# Patient Record
Sex: MALE | Race: WHITE | NOT HISPANIC OR LATINO | Employment: OTHER | URBAN - METROPOLITAN AREA
[De-identification: names, ages, dates, MRNs, and addresses within clinical notes are randomized per-mention and may not be internally consistent; named-entity substitution may affect disease eponyms.]

---

## 2019-11-19 ENCOUNTER — PROBLEM (OUTPATIENT)
Dept: URBAN - METROPOLITAN AREA CLINIC 79 | Facility: CLINIC | Age: 74
End: 2019-11-19

## 2019-11-19 DIAGNOSIS — H57.12: ICD-10-CM

## 2019-11-19 DIAGNOSIS — H02.051: ICD-10-CM

## 2019-11-19 DIAGNOSIS — H02.054: ICD-10-CM

## 2019-11-19 PROCEDURE — 92012 INTRM OPH EXAM EST PATIENT: CPT | Mod: 25

## 2019-11-19 PROCEDURE — 67820 REVISE EYELASHES: CPT

## 2019-11-19 ASSESSMENT — VISUAL ACUITY
OS_CC: 20/20-1
OD_CC: 20/25

## 2019-11-19 ASSESSMENT — TONOMETRY
OS_IOP_MMHG: 12
OD_IOP_MMHG: 14

## 2020-08-31 ENCOUNTER — PROBLEM (OUTPATIENT)
Dept: URBAN - METROPOLITAN AREA CLINIC 79 | Facility: CLINIC | Age: 75
End: 2020-08-31

## 2020-08-31 DIAGNOSIS — H25.13: ICD-10-CM

## 2020-08-31 DIAGNOSIS — H57.12: ICD-10-CM

## 2020-08-31 DIAGNOSIS — H02.054: ICD-10-CM

## 2020-08-31 DIAGNOSIS — H02.051: ICD-10-CM

## 2020-08-31 PROCEDURE — 92012 INTRM OPH EXAM EST PATIENT: CPT

## 2020-08-31 ASSESSMENT — VISUAL ACUITY
OD_CC: 20/20-2
OS_CC: 20/20

## 2020-09-22 ENCOUNTER — ESTABLISHED COMPREHENSIVE EXAM (OUTPATIENT)
Dept: URBAN - METROPOLITAN AREA CLINIC 79 | Facility: CLINIC | Age: 75
End: 2020-09-22

## 2020-09-22 VITALS — HEIGHT: 60 IN

## 2020-09-22 DIAGNOSIS — H16.223: ICD-10-CM

## 2020-09-22 DIAGNOSIS — H25.13: ICD-10-CM

## 2020-09-22 DIAGNOSIS — H52.03: ICD-10-CM

## 2020-09-22 PROCEDURE — 92015 DETERMINE REFRACTIVE STATE: CPT | Mod: GY

## 2020-09-22 PROCEDURE — 92014 COMPRE OPH EXAM EST PT 1/>: CPT

## 2020-09-22 ASSESSMENT — TONOMETRY
OD_IOP_MMHG: 14
OS_IOP_MMHG: 15

## 2020-09-22 ASSESSMENT — VISUAL ACUITY
OS_CC: 20/20
OD_SC: 20/40-2
OS_SC: 20/40
OS_CC: J2
OD_CC: 20/30-1
OD_CC: J2

## 2021-05-25 ENCOUNTER — PROBLEM (OUTPATIENT)
Dept: URBAN - METROPOLITAN AREA CLINIC 79 | Facility: CLINIC | Age: 76
End: 2021-05-25

## 2021-05-25 VITALS — HEIGHT: 60 IN

## 2021-05-25 DIAGNOSIS — H02.054: ICD-10-CM

## 2021-05-25 PROCEDURE — 67820 REVISE EYELASHES: CPT

## 2021-05-25 PROCEDURE — 92012 INTRM OPH EXAM EST PATIENT: CPT

## 2021-05-25 ASSESSMENT — VISUAL ACUITY
OS_SC: 20/25-1
OD_SC: 20/40+1
OD_PH: 20/25-2

## 2021-09-14 ENCOUNTER — ESTABLISHED COMPREHENSIVE EXAM (OUTPATIENT)
Dept: URBAN - METROPOLITAN AREA CLINIC 79 | Facility: CLINIC | Age: 76
End: 2021-09-14

## 2021-09-14 VITALS — DIASTOLIC BLOOD PRESSURE: 78 MMHG | HEIGHT: 60 IN | SYSTOLIC BLOOD PRESSURE: 144 MMHG

## 2021-09-14 DIAGNOSIS — H25.13: ICD-10-CM

## 2021-09-14 PROCEDURE — 92014 COMPRE OPH EXAM EST PT 1/>: CPT

## 2021-09-14 ASSESSMENT — TONOMETRY
OD_IOP_MMHG: 14
OS_IOP_MMHG: 14

## 2021-09-14 ASSESSMENT — VISUAL ACUITY
OD_SC: 20/30-2
OD_CC: 20/25
OS_SC: 20/30
OS_CC: 20/25
OS_CC: 20/25
OD_CC: 20/30-2

## 2022-02-18 ENCOUNTER — PROBLEM (OUTPATIENT)
Dept: URBAN - METROPOLITAN AREA CLINIC 79 | Facility: CLINIC | Age: 77
End: 2022-02-18

## 2022-02-18 DIAGNOSIS — H02.054: ICD-10-CM

## 2022-02-18 PROCEDURE — 67820 REVISE EYELASHES: CPT

## 2022-02-18 PROCEDURE — 92012 INTRM OPH EXAM EST PATIENT: CPT

## 2022-02-18 ASSESSMENT — VISUAL ACUITY
OS_CC: 20/20-1
OD_CC: 20/25-2

## 2022-02-18 ASSESSMENT — TONOMETRY: OS_IOP_MMHG: 14

## 2022-03-18 ENCOUNTER — FOLLOW UP (OUTPATIENT)
Dept: URBAN - METROPOLITAN AREA CLINIC 79 | Facility: CLINIC | Age: 77
End: 2022-03-18

## 2022-03-18 DIAGNOSIS — H25.13: ICD-10-CM

## 2022-03-18 PROCEDURE — 92014 COMPRE OPH EXAM EST PT 1/>: CPT

## 2022-03-18 ASSESSMENT — TONOMETRY
OD_IOP_MMHG: 14
OS_IOP_MMHG: 12

## 2022-03-18 ASSESSMENT — VISUAL ACUITY
OS_CC: 20/20
OD_CC: 20/30

## 2022-06-23 ENCOUNTER — PROBLEM (OUTPATIENT)
Dept: URBAN - METROPOLITAN AREA CLINIC 79 | Facility: CLINIC | Age: 77
End: 2022-06-23

## 2022-06-23 DIAGNOSIS — H02.054: ICD-10-CM

## 2022-06-23 PROCEDURE — 92012 INTRM OPH EXAM EST PATIENT: CPT | Mod: 25

## 2022-06-23 PROCEDURE — 67820 REVISE EYELASHES: CPT

## 2022-06-23 ASSESSMENT — VISUAL ACUITY
OS_CC: 20/20
OD_CC: 20/30

## 2022-06-23 ASSESSMENT — TONOMETRY
OD_IOP_MMHG: 12
OS_IOP_MMHG: 11

## 2022-07-29 ENCOUNTER — FOLLOW UP (OUTPATIENT)
Dept: URBAN - METROPOLITAN AREA CLINIC 79 | Facility: CLINIC | Age: 77
End: 2022-07-29

## 2022-07-29 DIAGNOSIS — H25.13: ICD-10-CM

## 2022-07-29 PROCEDURE — 92014 COMPRE OPH EXAM EST PT 1/>: CPT

## 2022-07-29 ASSESSMENT — TONOMETRY
OS_IOP_MMHG: 13
OD_IOP_MMHG: 12

## 2022-07-29 ASSESSMENT — VISUAL ACUITY
OS_CC: 20/20
OD_CC: 20/40+2

## 2022-10-31 PROBLEM — F41.1 GENERALIZED ANXIETY DISORDER: Status: ACTIVE | Noted: 2022-10-31

## 2022-10-31 PROBLEM — F33.42 MAJOR DEPRESSIVE DISORDER, RECURRENT EPISODE, IN FULL REMISSION (HCC): Status: ACTIVE | Noted: 2022-10-31

## 2022-10-31 RX ORDER — QUETIAPINE FUMARATE 50 MG/1
50 TABLET, FILM COATED ORAL
COMMUNITY
End: 2022-11-02 | Stop reason: SDUPTHER

## 2022-10-31 RX ORDER — CLONAZEPAM 0.5 MG/1
0.5 TABLET ORAL 2 TIMES DAILY
COMMUNITY
End: 2022-11-02 | Stop reason: SDUPTHER

## 2022-10-31 RX ORDER — SERTRALINE HYDROCHLORIDE 25 MG/1
25 TABLET, FILM COATED ORAL DAILY
COMMUNITY
End: 2022-11-02 | Stop reason: SDUPTHER

## 2022-11-02 ENCOUNTER — TELEMEDICINE (OUTPATIENT)
Dept: PSYCHIATRY | Facility: CLINIC | Age: 77
End: 2022-11-02

## 2022-11-02 DIAGNOSIS — F41.1 GENERALIZED ANXIETY DISORDER: ICD-10-CM

## 2022-11-02 DIAGNOSIS — F33.42 MAJOR DEPRESSIVE DISORDER, RECURRENT EPISODE, IN FULL REMISSION (HCC): Primary | ICD-10-CM

## 2022-11-02 RX ORDER — CLONAZEPAM 0.5 MG/1
TABLET ORAL
Qty: 45 TABLET | Refills: 1 | Status: SHIPPED | OUTPATIENT
Start: 2022-11-02

## 2022-11-02 RX ORDER — QUETIAPINE FUMARATE 50 MG/1
TABLET, FILM COATED ORAL
Qty: 90 TABLET | Refills: 1 | Status: SHIPPED | OUTPATIENT
Start: 2022-11-02

## 2022-11-02 RX ORDER — SERTRALINE HYDROCHLORIDE 25 MG/1
TABLET, FILM COATED ORAL
Qty: 90 TABLET | Refills: 1 | Status: SHIPPED | OUTPATIENT
Start: 2022-11-02

## 2022-11-02 NOTE — PATIENT INSTRUCTIONS
Continue Current Tx  Report Problems  Transfer to a new provider as it would be easier for him to be seen in person  Return 4 months

## 2022-11-02 NOTE — PSYCH
Virtual Regular Visit    Verification of patient location: At [facility] in [facility]    Patient is located in the following state in which I hold an active license [place]      Assessment/Plan:       Diagnoses and all orders for this visit:    Major depressive disorder, recurrent episode, in full remission ([facility] [address] )  -     sertraline (ZOLOFT) 25 mg tablet; Take 1 Po QD  -     QUEtiapine (SEROquel) 50 mg tablet; deonte 1 PO Q HS  -     clonazePAM (KlonoPIN) 0 5 mg tablet; Take 1/2  PO Q HS    Generalized anxiety disorder  -     sertraline (ZOLOFT) 25 mg tablet; Take 1 Po QD  -     QUEtiapine (SEROquel) 50 mg tablet; deonte 1 PO Q HS  -     clonazePAM (KlonoPIN) 0 5 mg tablet; Take 1/2  PO Q HS    Other orders  -     Discontinue: clonazePAM (KlonoPIN) 0 5 mg tablet; Take 0 5 mg by mouth 2 (two) times a day Take 1/2  PO Q HS  -     Discontinue: sertraline (ZOLOFT) 25 mg tablet; Take 25 mg by mouth daily Take 1 Po QD  -     Discontinue: QUEtiapine (SEROquel) 50 mg tablet; Take 50 mg by mouth daily at bedtime deonte 1 PO Q HS          Goals addressed in session:   Good Health  Counseling provided:      Treatment Recommendations- Risks Benefits       Immediate Medical/Psychiatric/Psychotherapy Treatments and Any Precautions:     Risks, Benefits And Possible Side Effects Of Medications:  Risks, benefits, and possible side effects of medications explained to patient and patient verbalizes understanding    Controlled Medication Discussion: The patient has been filling controlled prescriptions on time as prescribed to [facility] program      Reason for visit is No chief complaint on file  Medication Management      Encounter provider DALILA Matta    Provider [place] at [address] [place] [address]  [phone]      Recent Visits  No visits were found meeting these conditions    Showing recent visits within past 7 days and meeting all other requirements  Today's Visits  Date Type Provider Dept   11/02/22 Telemedicine Carmen Diamond, [facility] today's visits and meeting all other requirements  Future Appointments  No visits were found meeting these conditions  Showing future appointments within next 150 days and meeting all other requirements       The patient was identified by name and date of birth  Lazaro Muller was informed that this is a telemedicine visit and that the visit is being conducted throughthe Tray platform  He agrees to proceed     My office door was closed  No one else was in the room  He acknowledged consent and understanding of privacy and security of the video platform  The patient has agreed to participate and understands they can discontinue the visit at any time  Patient is aware this is a billable service  Subjective    Lazaro Muller is a 68 y o  male    Here today for a med check      normal appetite      HPI  Mood good  Denies anxiety  Watching [employer] in [place] [employer]  No problems with medication  Appetite good  Health OK  Denies SI/HI    No past medical history on file  No past surgical history on file  Current Outpatient Medications   Medication Sig Dispense Refill   • clonazePAM (KlonoPIN) 0 5 mg tablet Take 1/2  PO Q HS 45 tablet 1   • QUEtiapine (SEROquel) 50 mg tablet deonte 1 PO Q HS 90 tablet 1   • sertraline (ZOLOFT) 25 mg tablet Take 1 Po QD 90 tablet 1     No current facility-administered medications for this visit  Not on File    Social History     Substance and Sexual Activity   Drug Use Not on file       No family history on file          Objective    Mental status:  Appearance calm and cooperative  and adequate hygiene and grooming   Mood mood appropriate   Affect affect appropriate    Speech a normal rate and fluent   Thought Processes normal thought processes   Hallucinations no hallucinations present    Thought Content no delusions Abnormal Thoughts no suicidal thoughts  and no homicidal thoughts    Orientation  oriented to person and place and time   Remote Memory short term memory intact and long term memory intact   Attention Span concentration intact   Intellect Appears to be of Average Intelligence   Insight Insight intact   Judgement judgment was intact   Muscle Strength Muscle strength and tone were normal and Normal gait    Language no difficulty naming common objects   Fund of Knowledge displays adequate knowledge of current events   Pain none   Pain Scale 0       Video Exam    There were no vitals filed for this visit      I spent 20  minutes directly with the patient during this visit    Patient Instructions    Continue Current Tx  Report Problems  Transfer to a new provider as it would be easier for him to be seen in person  Return 4 months     Visit Time    Visit Start Time: 1430  Visit Stop Time: 1450  Total Visit Duration: 20 min

## 2022-11-02 NOTE — BH TREATMENT PLAN
TREATMENT PLAN (Medication Management Only)        [facility]    Name and Date of Birth:  Aden Hawkins 68 y o  1945  Date of Treatment Plan: November 2, 2022  Diagnosis/Diagnoses:    1  Major depressive disorder, recurrent episode, in full remission ([address] )    2  Generalized anxiety disorder      Strengths/Personal Resources for Self-Care: supportive family, supportive friends, taking medications as prescribed, ability to adapt to life changes, ability to communicate needs, ability to communicate well, ability to listen, ability to reason, ability to understand psychiatric illness  Area/Areas of need (in own words): depression  1  Long Term Goal: maintain depression  Target Date:6 months - 5/2/2023  Person/Persons responsible for completion of goal: Payal Gardner  2  Short Term Objective (s) - How will we reach this goal?:   A  Provider new recommended medication/dosage changes and/or continue medication(s): continue current medications as prescribed Zoloft, Seroquel, Klonopin  B  N/A   C  N/A  Target Date:6 months - 5/2/2023  Person/Persons Responsible for Completion of Goal: Payal Gardner  Progress Towards Goals: stable  Treatment Modality: medication management every 4 months  Review due 180 days from date of this plan: 6 months - 5/2/2023  Expected length of service: maintenance  My Physician/PA/NP and I have developed this plan together and I agree to work on the goals and objectives  I understand the treatment goals that were developed for my treatment

## 2022-12-01 ENCOUNTER — PROBLEM (OUTPATIENT)
Dept: URBAN - METROPOLITAN AREA CLINIC 79 | Facility: CLINIC | Age: 77
End: 2022-12-01

## 2022-12-01 DIAGNOSIS — H02.054: ICD-10-CM

## 2022-12-01 PROCEDURE — 92012 INTRM OPH EXAM EST PATIENT: CPT

## 2022-12-01 PROCEDURE — 67820 REVISE EYELASHES: CPT

## 2022-12-01 ASSESSMENT — VISUAL ACUITY
OD_CC: 20/25
OS_CC: 20/20

## 2022-12-01 ASSESSMENT — TONOMETRY
OD_IOP_MMHG: 12
OS_IOP_MMHG: 8

## 2022-12-21 ENCOUNTER — DOCUMENTATION (OUTPATIENT)
Dept: PSYCHIATRY | Facility: CLINIC | Age: 77
End: 2022-12-21

## 2022-12-21 NOTE — PSYCH
[facility]    Patient Name Vincent Santiago     Date of Birth: 68 y o  1945      MRN: [id number]    Admission Date: several years ago    Date of Transfer: December 21, 2022    Admission Diagnosis:     Major Depressive Dsiorder, recurrent, in full remission  Generalized Anxiety Disorder    Current Diagnosis:     No diagnosis found  Reason for Admission: Delmi Bush presented for treatment due to depression and anxiety  Primary complaints included DEPRESSIVE SYMPTOMS: unremarkable - euthymic mood and ANXIETY SYMPTOMS: unremarkable  Progress in Treatment: Delmi Bush was seen for Medication Management  During the course of treatment he      Episodes of Higher Level of Care: No    Transfer request Initiated by: Psychiatrist: Nurse Practitioner Carlos Alberto Kendrick Therapist: None    Reason for Transfer Request: patient requested transfer    Does this individual need a clinician with specialized training/expertise?: No    Is this client working with any other hospitals Providers/Therapists?  Psychiatrist: None Therapist: None    Other pertinent issues: Major Depressive Disorder and Generalized Anxiety Disorder    Are there any specific individuals who would be a “best fit” or who have already agreed to accept this transfer request?      Psychiatrist: Nurse Practitioner Carlos Alberto Kendrick   Therapist: None  Rationale: Not Applicable    Attempts to maintain the current therapeutic relationship: Not Applicable    Transfer request routed to Clinical Coordinator for input and/or approval      Comments from other involved providers and/or clinical coordinator: None    Lizz Hurd, [facility]

## 2022-12-29 ENCOUNTER — PROBLEM (OUTPATIENT)
Dept: URBAN - METROPOLITAN AREA CLINIC 79 | Facility: CLINIC | Age: 77
End: 2022-12-29

## 2022-12-29 DIAGNOSIS — H01.021: ICD-10-CM

## 2022-12-29 DIAGNOSIS — H16.223: ICD-10-CM

## 2022-12-29 DIAGNOSIS — H01.024: ICD-10-CM

## 2022-12-29 PROCEDURE — 92012 INTRM OPH EXAM EST PATIENT: CPT

## 2022-12-29 ASSESSMENT — VISUAL ACUITY
OS_CC: 20/25+2
OD_CC: 20/30

## 2022-12-29 ASSESSMENT — TONOMETRY
OS_IOP_MMHG: 10
OD_IOP_MMHG: 11

## 2023-01-18 DIAGNOSIS — F33.42 MAJOR DEPRESSIVE DISORDER, RECURRENT EPISODE, IN FULL REMISSION (HCC): ICD-10-CM

## 2023-01-18 DIAGNOSIS — F41.1 GENERALIZED ANXIETY DISORDER: ICD-10-CM

## 2023-01-18 RX ORDER — CLONAZEPAM 0.5 MG/1
TABLET ORAL
Qty: 45 TABLET | Refills: 1 | Status: SHIPPED | OUTPATIENT
Start: 2023-01-18 | End: 2023-01-19 | Stop reason: SDUPTHER

## 2023-01-18 NOTE — TELEPHONE ENCOUNTER
Patient is a [place]  His last appt 11/22/2022 with Haley with Sergio - 2/22/23 @1:20    Medication Refill Request     Name of Medication Klonopin  Dose/Frequency  5mg/ 1/2 PO Q HS  Quantity 45tabs  Verified pharmacy   [x]  Verified ordering Provider   [x]  Does patient have enough for the next 3 days? Yes [x] No []  Does patient have a follow-up appointment scheduled?  Yes [x] No []   If so when is appointment: [email or web address]

## 2023-01-19 DIAGNOSIS — F41.1 GENERALIZED ANXIETY DISORDER: ICD-10-CM

## 2023-01-19 DIAGNOSIS — F33.42 MAJOR DEPRESSIVE DISORDER, RECURRENT EPISODE, IN FULL REMISSION (HCC): ICD-10-CM

## 2023-01-19 RX ORDER — CLONAZEPAM 0.5 MG/1
TABLET ORAL
Qty: 15 TABLET | Refills: 0 | Status: SHIPPED | OUTPATIENT
Start: 2023-01-19 | End: 2023-02-22 | Stop reason: SDUPTHER

## 2023-02-22 ENCOUNTER — OFFICE VISIT (OUTPATIENT)
Dept: PSYCHIATRY | Facility: CLINIC | Age: 78
End: 2023-02-22

## 2023-02-22 DIAGNOSIS — F33.42 MAJOR DEPRESSIVE DISORDER, RECURRENT EPISODE, IN FULL REMISSION (HCC): ICD-10-CM

## 2023-02-22 DIAGNOSIS — F41.1 GENERALIZED ANXIETY DISORDER: ICD-10-CM

## 2023-02-22 RX ORDER — CLONAZEPAM 0.5 MG/1
TABLET ORAL
Qty: 45 TABLET | Refills: 0 | Status: SHIPPED | OUTPATIENT
Start: 2023-02-22

## 2023-02-22 RX ORDER — SERTRALINE HYDROCHLORIDE 25 MG/1
TABLET, FILM COATED ORAL
Qty: 90 TABLET | Refills: 1 | Status: SHIPPED | OUTPATIENT
Start: 2023-02-22

## 2023-02-22 RX ORDER — QUETIAPINE FUMARATE 50 MG/1
TABLET, FILM COATED ORAL
Qty: 90 TABLET | Refills: 1 | Status: SHIPPED | OUTPATIENT
Start: 2023-02-22

## 2023-02-22 NOTE — PSYCH
Psychiatric Medication Management - [address] 68 y o  male MRN: [id number]        This note was not shared with the patient due to reasonable likelihood of causing patient harm     Reason for Visit: medication refills    Subjective: Former pt of Mary Kay Cho and treated for depression and anxiety  Has been on Klonopin, Seroquel and Zoloft the last 10 years  Pt tolerates medications well  No evidence of EPS/TD or any other medication se  States that medications have been working well  Mood is reported as stable  Pt reports good energy and good motivation  Sleep is reported as " good " Denies SI/HI  Enjoys walking, watching TV and spending time with grandchildren  Pt denies anxiety symptoms  Overall stable  Review Of Systems:     Constitutional negative   ENT Vertigo- testing pending    Cardiovascular Benign heart murmur   Respiratory negative   Gastrointestinal negative   Genitourinary negative   Musculoskeletal Hip and lower back pain- mild   Integumentary negative   Neurological negative   Endocrine negative     Past Medical History:   Patient Active Problem List   Diagnosis   • Generalized anxiety disorder   • Major depressive disorder, recurrent episode, in full remission ([facility] [address] )       Allergies: NKDA    Past Surgical History: cleft palate repair, knee repair s/p sport injury    Past Psychiatric History: Nelida Ferraro    Family Psychiatric History: father committed suicide when pt was 37    Social History: single, lost girlfriend of over 27 years to Covid in 2020, 2 step-children, 3 step- grandkids    Substance Abuse History: pt denies    Traumatic History: emotional trauma ( found father who committed suicide)    The following portions of the patient's history were reviewed and updated as appropriate: past family history, past medical history, past social history, past surgical history and problem list     Objective: There were no vitals filed for this visit        Weight (last 2 days) None          Mental status:  Appearance Casually dressed   Mood euthymic   Affect pleasant   Speech Normal rate, rhythm, and volume   Thought Processes Linear and goal directed   Associations intact   Hallucinations Denies any auditory or visual hallucinations   Thought Content No passive or active suicidal or homicidal ideation, intent, or plan   Orientation Oriented to person, place, time, and situation   Recent and Remote Memory Grossly intact   Attention Span and Concentration intact   Intellect Appears to be of Average Intelligence   Insight intact   Judgement intact   Muscle Strength No abnormal movements   Language Within normal limits   Fund of Knowledge Age appropriate   Pain mild     PHQ-A Depression Screening              ? Assessment/Plan: stable symptoms/ continue current medications: Seroquel 50 mg hs, Zoloft 25 mg daily and Klonopin 0 5 mg 1/2 tab hs prn  Follow up in 3 mos  Diagnosis: Major Depression, recurrent in full remission, BASSEM      Risks, Benefits And Possible Side Effects Of Medications:  Risks, benefits, and possible side effects of medications explained to patient and family, they verbalize understanding    Controlled Medication Discussion: Discussed with patient Black Box warning on concurrent use of benzodiazepines and opioid medications including sedation, respiratory depression, coma and death  Patient understands the risk of treatment with benzodiazepines in addition to opioids and wants to continue taking those medications  Psychotherapy Provided: Supportive psychotherapy provided  Yes  Medication education provided to Jose Whitt        Visit Time    Visit Start Time: 13:34  Visit Stop Time: 14:04  Total Visit Duration: 30 minutes

## 2023-04-20 ENCOUNTER — PROBLEM (OUTPATIENT)
Dept: URBAN - METROPOLITAN AREA CLINIC 79 | Facility: CLINIC | Age: 78
End: 2023-04-20

## 2023-04-20 DIAGNOSIS — H01.021: ICD-10-CM

## 2023-04-20 DIAGNOSIS — H01.024: ICD-10-CM

## 2023-04-20 DIAGNOSIS — H02.054: ICD-10-CM

## 2023-04-20 DIAGNOSIS — H16.223: ICD-10-CM

## 2023-04-20 PROCEDURE — 67820 REVISE EYELASHES: CPT

## 2023-04-20 PROCEDURE — 92012 INTRM OPH EXAM EST PATIENT: CPT

## 2023-04-20 ASSESSMENT — VISUAL ACUITY
OD_CC: 20/30
OS_CC: 20/25-1

## 2023-04-20 ASSESSMENT — TONOMETRY
OD_IOP_MMHG: 16
OS_IOP_MMHG: 15

## 2023-05-17 ENCOUNTER — OFFICE VISIT (OUTPATIENT)
Dept: PSYCHIATRY | Facility: CLINIC | Age: 78
End: 2023-05-17

## 2023-05-17 DIAGNOSIS — F33.42 MAJOR DEPRESSIVE DISORDER, RECURRENT EPISODE, IN FULL REMISSION (HCC): ICD-10-CM

## 2023-05-17 DIAGNOSIS — F41.1 GENERALIZED ANXIETY DISORDER: ICD-10-CM

## 2023-05-17 RX ORDER — SERTRALINE HYDROCHLORIDE 25 MG/1
TABLET, FILM COATED ORAL
Qty: 90 TABLET | Refills: 1 | Status: SHIPPED | OUTPATIENT
Start: 2023-05-17

## 2023-05-17 RX ORDER — QUETIAPINE FUMARATE 50 MG/1
TABLET, FILM COATED ORAL
Qty: 90 TABLET | Refills: 1 | Status: SHIPPED | OUTPATIENT
Start: 2023-05-17

## 2023-05-17 RX ORDER — CLONAZEPAM 0.5 MG/1
TABLET ORAL
Qty: 45 TABLET | Refills: 2 | Status: SHIPPED | OUTPATIENT
Start: 2023-05-17

## 2023-05-17 NOTE — PSYCH
"Psychiatric Medication Management - [address] 66 y o  male MRN: [id number]        This note was not shared with the patient due to reasonable likelihood of causing patient harm     Reason for Visit: medication refills    Subjective: Former pt of Johan Henriquez and treated for depression and anxiety  Has been on Klonopin, Seroquel and Zoloft the last 10 years  Pt tolerates medications well  No evidence of EPS/TD or any other medication se  Pt reports doing well  He reports even and stable mood  Pt reports good energy and good motivation  Sleep is reported as \" good  \" Denies SI/HI  Enjoys walking, watching TV and spending time and visiting family  Has plans to visit family in [place] this summer  Pt denies anxiety symptoms  Overall stable  Review Of Systems:     Constitutional negative   ENT Vertigo- testing pending    Cardiovascular Benign heart murmur   Respiratory negative   Gastrointestinal negative   Genitourinary negative   Musculoskeletal Hip and lower back pain- mild   Integumentary negative   Neurological negative   Endocrine negative     Past Medical History:   Patient Active Problem List   Diagnosis   • Generalized anxiety disorder   • Major depressive disorder, recurrent episode, in full remission ([facility] [address] )       Allergies: NKDA    Past Surgical History: cleft palate repair, knee repair s/p sport injury    Past Psychiatric History: Marsha Mane    Family Psychiatric History: father committed suicide when pt was 37    Social History: single, lost girlfriend of over 27 years to Covid in 2020, 2 step-children, 3 step- grandkids    Substance Abuse History: pt denies    Traumatic History: emotional trauma ( found father who committed suicide)    The following portions of the patient's history were reviewed and updated as appropriate: past family history, past medical history, past social history, past surgical history and problem list     Objective:   There were no vitals filed for this " visit  Weight (last 2 days)     None          Mental status:  Appearance Casually dressed   Mood euthymic   Affect pleasant   Speech Normal rate, rhythm, and volume   Thought Processes Linear and goal directed   Associations intact   Hallucinations Denies any auditory or visual hallucinations   Thought Content No passive or active suicidal or homicidal ideation, intent, or plan   Orientation Oriented to person, place, time, and situation   Recent and Remote Memory Grossly intact   Attention Span and Concentration intact   Intellect Appears to be of Average Intelligence   Insight intact   Judgement intact   Muscle Strength No abnormal movements   Language Within normal limits   Fund of Knowledge Age appropriate   Pain mild     PHQ-A Depression Screening              ? Assessment/Plan: stable symptoms/ continue current medications: Seroquel 50 mg hs, Zoloft 25 mg daily and Klonopin 0 5 mg 1/2 tab hs prn  Pt aware this prescriber leaving and he will follow up with An Mclain in 3 mos  Diagnosis: Major Depression, recurrent in full remission, BASSEM    Risks, Benefits And Possible Side Effects Of Medications:  Risks, benefits, and possible side effects of medications explained to patient and family, they verbalize understanding    Controlled Medication Discussion: Discussed with patient Black Box warning on concurrent use of benzodiazepines and opioid medications including sedation, respiratory depression, coma and death  Patient understands the risk of treatment with benzodiazepines in addition to opioids and wants to continue taking those medications  Psychotherapy Provided: Supportive psychotherapy provided  Yes  Medication education provided to Cherelle Negron        Visit Time    Visit Start Time: 13:20  Visit Stop Time: 13:30  Total Visit Duration: 10 minutes

## 2023-08-04 ENCOUNTER — ESTABLISHED COMPREHENSIVE EXAM (OUTPATIENT)
Dept: URBAN - METROPOLITAN AREA CLINIC 79 | Facility: CLINIC | Age: 78
End: 2023-08-04

## 2023-08-04 DIAGNOSIS — H02.054: ICD-10-CM

## 2023-08-04 DIAGNOSIS — H40.013: ICD-10-CM

## 2023-08-04 DIAGNOSIS — H25.13: ICD-10-CM

## 2023-08-04 PROCEDURE — 92133 CPTRZD OPH DX IMG PST SGM ON: CPT

## 2023-08-04 PROCEDURE — 92014 COMPRE OPH EXAM EST PT 1/>: CPT | Mod: 25

## 2023-08-04 PROCEDURE — 67820 REVISE EYELASHES: CPT

## 2023-08-04 ASSESSMENT — TONOMETRY
OD_IOP_MMHG: 11
OS_IOP_MMHG: 10

## 2023-08-04 ASSESSMENT — VISUAL ACUITY
OS_SC: 20/30+1
OS_CC: 20/25
OS_CC: 20/20-2
OD_SC: 20/40
OD_CC: 20/25
OD_CC: 20/25+2

## 2023-08-08 ENCOUNTER — PROBLEM (OUTPATIENT)
Dept: URBAN - METROPOLITAN AREA CLINIC 79 | Facility: CLINIC | Age: 78
End: 2023-08-08

## 2023-08-08 DIAGNOSIS — H16.223: ICD-10-CM

## 2023-08-08 PROCEDURE — 92012 INTRM OPH EXAM EST PATIENT: CPT

## 2023-08-08 ASSESSMENT — VISUAL ACUITY
OS_CC: 20/20
OD_CC: 20/25

## 2023-08-08 ASSESSMENT — TONOMETRY
OS_IOP_MMHG: 14
OD_IOP_MMHG: 14

## 2023-08-17 ENCOUNTER — OFFICE VISIT (OUTPATIENT)
Dept: PSYCHIATRY | Facility: CLINIC | Age: 78
End: 2023-08-17
Payer: MEDICARE

## 2023-08-17 DIAGNOSIS — F33.42 MAJOR DEPRESSIVE DISORDER, RECURRENT EPISODE, IN FULL REMISSION (HCC): ICD-10-CM

## 2023-08-17 DIAGNOSIS — F41.1 GENERALIZED ANXIETY DISORDER: ICD-10-CM

## 2023-08-17 PROCEDURE — 99212 OFFICE O/P EST SF 10 MIN: CPT

## 2023-08-17 RX ORDER — QUETIAPINE FUMARATE 50 MG/1
TABLET, FILM COATED ORAL
Qty: 90 TABLET | Refills: 1 | Status: SHIPPED | OUTPATIENT
Start: 2023-08-17

## 2023-08-17 RX ORDER — CLONAZEPAM 0.5 MG/1
TABLET ORAL
Qty: 45 TABLET | Refills: 2 | Status: SHIPPED | OUTPATIENT
Start: 2023-08-17

## 2023-08-17 RX ORDER — SERTRALINE HYDROCHLORIDE 25 MG/1
TABLET, FILM COATED ORAL
Qty: 90 TABLET | Refills: 1 | Status: SHIPPED | OUTPATIENT
Start: 2023-08-17

## 2023-08-17 NOTE — PSYCH
Psychiatric Medication Management - [facility] 66 y.o. male MRN: [id number]        This note was not shared with the patient due to reasonable likelihood of causing patient harm     Reason for Visit: medication management    Subjective: Former patient of Shanna Bassett is being treated for depression and anxiety. Patient is observed on Klonopin 0.5mg 1/2 tab hs prn, seroquel 50mg po hs, and zoloft 25mg po daily and tolerates it well. Patient has been compliant with his medications and denies any side effects. Patient reports he has been doing well since last appointment and mood has been stable. Sleep and appetite has been stable. Good energy and motivation. Patient enjoys gardening with his grandson. Patient denies panic attacks, ah/vh, paranoia, elevated moods, and si/hi.        Review Of Systems:     Constitutional negative   ENT Vertigo- testing pending    Cardiovascular Benign heart murmur   Respiratory negative   Gastrointestinal negative   Genitourinary negative   Musculoskeletal Hip and lower back pain- mild   Integumentary negative   Neurological negative   Endocrine negative     Past Medical History:   Patient Active Problem List   Diagnosis   • Generalized anxiety disorder   • Major depressive disorder, recurrent episode, in full remission ([address])       Allergies: NKDA    Past Surgical History: cleft palate repair, knee repair s/p sport injury    Past Psychiatric History: Rosio Boland    Family Psychiatric History: father committed suicide when pt was 37    Social History: single, lost girlfriend of over 27 years to Covid in 2020, 2 step-children, 3 step- grandkids    Substance Abuse History: pt denies    Traumatic History: emotional trauma ( found father who committed suicide)    The following portions of the patient's history were reviewed and updated as appropriate: past family history, past medical history, past social history, past surgical history and problem list.    Objective: There were no vitals filed for this visit. Weight (last 2 days)     None          Mental status:  Appearance Casually dressed   Mood euthymic   Affect pleasant   Speech Normal rate, rhythm, and volume   Thought Processes Linear and goal directed   Associations intact   Hallucinations Denies any auditory or visual hallucinations   Thought Content No passive or active suicidal or homicidal ideation, intent, or plan   Orientation Oriented to person, place, time, and situation   Recent and Remote Memory Grossly intact   Attention Span and Concentration intact   Intellect Appears to be of Average Intelligence   Insight intact   Judgement intact   Muscle Strength No abnormal movements   Language Within normal limits   Fund of Knowledge Age appropriate   Pain mild     PHQ-A Depression Screening              Assessment/Plan:   1. Continue Seroquel 50 mg hs  2. Continue Zoloft 25 mg daily  3. Continue Klonopin 0.5 mg 1/2 tab hs prn  4. Call if any adverse medication side effects  5. Follow up in 3 months      Diagnosis: Major Depression, recurrent in full remission, BASSEM    Risks, Benefits And Possible Side Effects Of Medications:  Risks, benefits, and possible side effects of medications explained to patient and family, they verbalize understanding    Controlled Medication Discussion: Discussed with patient Black Box warning on concurrent use of benzodiazepines and opioid medications including sedation, respiratory depression, coma and death. Patient understands the risk of treatment with benzodiazepines in addition to opioids and wants to continue taking those medications. Psychotherapy Provided: Supportive psychotherapy provided. Yes  Medication education provided to Agnieszka Coronado.       Visit Time    Visit Start Time: 12:30 PM  Visit Stop Time: 12:45 PM  Total Visit Duration: 15 minutes

## 2023-10-31 DIAGNOSIS — F33.42 MAJOR DEPRESSIVE DISORDER, RECURRENT EPISODE, IN FULL REMISSION (HCC): ICD-10-CM

## 2023-10-31 DIAGNOSIS — F41.1 GENERALIZED ANXIETY DISORDER: ICD-10-CM

## 2023-10-31 RX ORDER — CLONAZEPAM 0.5 MG/1
TABLET ORAL
Qty: 3 TABLET | Refills: 0 | Status: SHIPPED | OUTPATIENT
Start: 2023-10-31

## 2023-11-16 ENCOUNTER — OFFICE VISIT (OUTPATIENT)
Dept: PSYCHIATRY | Facility: CLINIC | Age: 78
End: 2023-11-16
Payer: MEDICARE

## 2023-11-16 DIAGNOSIS — F33.42 MAJOR DEPRESSIVE DISORDER, RECURRENT EPISODE, IN FULL REMISSION (HCC): ICD-10-CM

## 2023-11-16 DIAGNOSIS — F41.1 GENERALIZED ANXIETY DISORDER: ICD-10-CM

## 2023-11-16 PROCEDURE — 99212 OFFICE O/P EST SF 10 MIN: CPT

## 2023-11-16 RX ORDER — QUETIAPINE FUMARATE 50 MG/1
TABLET, FILM COATED ORAL
Qty: 90 TABLET | Refills: 1 | Status: SHIPPED | OUTPATIENT
Start: 2023-11-16

## 2023-11-16 RX ORDER — CLONAZEPAM 0.5 MG/1
TABLET ORAL
Qty: 45 TABLET | Refills: 0 | Status: SHIPPED | OUTPATIENT
Start: 2023-11-16

## 2023-11-16 RX ORDER — SERTRALINE HYDROCHLORIDE 25 MG/1
TABLET, FILM COATED ORAL
Qty: 90 TABLET | Refills: 1 | Status: SHIPPED | OUTPATIENT
Start: 2023-11-16

## 2023-11-16 NOTE — PSYCH
Psychiatric Medication Management - [facility] 66 y.o. male MRN: [id number]        This note was not shared with the patient due to reasonable likelihood of causing patient harm     Reason for Visit: medication management    Subjective:   Patient is being treated for depression and anxiety. Patient is observed on Klonopin 0.5mg 1/2 tab hs prn, seroquel 50mg po hs, and zoloft 25mg po daily. Patient tolerates the medications well, has been compliant and denies any side effects. Patient doing well since last appointment and reports stable mood. Patient denies dysphoric moods including depression and anxiety. Sleep and appetite has been stable. Good energy and motivation. Patient enjoys watching the stock market. Patient denies elevated moods, panic attacks, paranoia, ah/vh and si/hi.       Review Of Systems:     Constitutional negative   ENT Vertigo- testing pending    Cardiovascular Benign heart murmur   Respiratory negative   Gastrointestinal negative   Genitourinary negative   Musculoskeletal Hip and lower back pain- mild   Integumentary negative   Neurological negative   Endocrine negative     Past Medical History:   Patient Active Problem List   Diagnosis    Generalized anxiety disorder    Major depressive disorder, recurrent episode, in full remission ([address])       Allergies: NKDA    Past Surgical History: cleft palate repair, knee repair s/p sport injury    Past Psychiatric History: Yarely Mortensen    Family Psychiatric History: father committed suicide when pt was 37    Social History: single, lost girlfriend of over 27 years to Covid in 2020, 2 step-children, 3 step- grandkids    Substance Abuse History: pt denies    Traumatic History: emotional trauma ( found father who committed suicide)    The following portions of the patient's history were reviewed and updated as appropriate: past family history, past medical history, past social history, past surgical history and problem list.    Objective: There were no vitals filed for this visit. Weight (last 2 days)       None            Mental status:  Appearance Casually dressed   Mood euthymic   Affect pleasant   Speech Normal rate, rhythm, and volume   Thought Processes Linear and goal directed   Associations intact   Hallucinations Denies any auditory or visual hallucinations   Thought Content No passive or active suicidal or homicidal ideation, intent, or plan   Orientation Oriented to person, place, time, and situation   Recent and Remote Memory Grossly intact   Attention Span and Concentration intact   Intellect Appears to be of Average Intelligence   Insight intact   Judgement intact   Muscle Strength No abnormal movements   Language Within normal limits   Fund of Knowledge Age appropriate   Pain mild     PHQ-A Depression Screening                Assessment/Plan:   1. Continue Seroquel 50 mg hs  2. Continue Zoloft 25 mg daily  3. Continue Klonopin 0.5 mg 1/2 tab hs prn  4. Call if any adverse medication side effects  5. Follow up in 3 months      Diagnosis: Major Depression, recurrent in full remission, BASSEM    Risks, Benefits And Possible Side Effects Of Medications:  Risks, benefits, and possible side effects of medications explained to patient and family, they verbalize understanding    Controlled Medication Discussion: Discussed with patient Black Box warning on concurrent use of benzodiazepines and opioid medications including sedation, respiratory depression, coma and death. Patient understands the risk of treatment with benzodiazepines in addition to opioids and wants to continue taking those medications. Psychotherapy Provided: Supportive psychotherapy provided. Yes  Medication education provided to Daria Anuj.       Visit Time    Visit Start Time: 12:50 PM  Visit Stop Time: 1:00 PM  Total Visit Duration:  10 minutes

## 2023-12-22 ENCOUNTER — PROBLEM (OUTPATIENT)
Dept: URBAN - METROPOLITAN AREA CLINIC 79 | Facility: CLINIC | Age: 78
End: 2023-12-22

## 2023-12-22 DIAGNOSIS — H00.14: ICD-10-CM

## 2023-12-22 DIAGNOSIS — H25.13: ICD-10-CM

## 2023-12-22 PROCEDURE — 92012 INTRM OPH EXAM EST PATIENT: CPT

## 2023-12-22 ASSESSMENT — VISUAL ACUITY
OS_CC: 20/20
OD_CC: 20/20-1

## 2023-12-22 ASSESSMENT — TONOMETRY
OS_IOP_MMHG: 16
OD_IOP_MMHG: 13

## 2024-01-05 ENCOUNTER — FOLLOW UP (OUTPATIENT)
Dept: URBAN - METROPOLITAN AREA CLINIC 79 | Facility: CLINIC | Age: 79
End: 2024-01-05

## 2024-01-05 DIAGNOSIS — H00.14: ICD-10-CM

## 2024-01-05 DIAGNOSIS — H25.13: ICD-10-CM

## 2024-01-05 PROCEDURE — 92012 INTRM OPH EXAM EST PATIENT: CPT

## 2024-01-05 ASSESSMENT — VISUAL ACUITY
OD_CC: 20/25-2
OS_CC: 20/20

## 2024-01-05 ASSESSMENT — TONOMETRY
OD_IOP_MMHG: 14
OS_IOP_MMHG: 14

## 2024-02-06 ENCOUNTER — IOP CHECK (OUTPATIENT)
Dept: URBAN - METROPOLITAN AREA CLINIC 79 | Facility: CLINIC | Age: 79
End: 2024-02-06

## 2024-02-06 DIAGNOSIS — H02.056: ICD-10-CM

## 2024-02-06 DIAGNOSIS — H40.013: ICD-10-CM

## 2024-02-06 DIAGNOSIS — H02.054: ICD-10-CM

## 2024-02-06 PROCEDURE — 76514 ECHO EXAM OF EYE THICKNESS: CPT

## 2024-02-06 PROCEDURE — 92083 EXTENDED VISUAL FIELD XM: CPT

## 2024-02-06 PROCEDURE — 67820 REVISE EYELASHES: CPT

## 2024-02-06 PROCEDURE — 92012 INTRM OPH EXAM EST PATIENT: CPT | Mod: 25

## 2024-02-06 ASSESSMENT — VISUAL ACUITY
OD_CC: 20/20-1
OS_CC: 20/20

## 2024-02-06 ASSESSMENT — TONOMETRY
OD_IOP_MMHG: 14
OS_IOP_MMHG: 12

## 2024-02-06 ASSESSMENT — PACHYMETRY
OS_CT_UM: 576
OD_CT_UM: 582

## 2024-02-21 ENCOUNTER — OFFICE VISIT (OUTPATIENT)
Dept: PSYCHIATRY | Facility: CLINIC | Age: 79
End: 2024-02-21
Payer: MEDICARE

## 2024-02-21 DIAGNOSIS — F33.42 MAJOR DEPRESSIVE DISORDER, RECURRENT EPISODE, IN FULL REMISSION (HCC): ICD-10-CM

## 2024-02-21 DIAGNOSIS — F41.1 GENERALIZED ANXIETY DISORDER: ICD-10-CM

## 2024-02-21 PROCEDURE — 99213 OFFICE O/P EST LOW 20 MIN: CPT

## 2024-02-21 RX ORDER — QUETIAPINE FUMARATE 50 MG/1
TABLET, FILM COATED ORAL
Qty: 90 TABLET | Refills: 1 | Status: SHIPPED | OUTPATIENT
Start: 2024-02-21

## 2024-02-21 RX ORDER — SERTRALINE HYDROCHLORIDE 25 MG/1
TABLET, FILM COATED ORAL
Qty: 90 TABLET | Refills: 1 | Status: SHIPPED | OUTPATIENT
Start: 2024-02-21

## 2024-02-21 RX ORDER — CLONAZEPAM 0.5 MG/1
TABLET ORAL
Qty: 45 TABLET | Refills: 1 | Status: SHIPPED | OUTPATIENT
Start: 2024-02-21

## 2024-02-21 NOTE — PSYCH
"Psychiatric Medication Management - Behavioral Health   Flavio Cuevas 78 y.o. male MRN: [id number]        This note was not shared with the patient due to reasonable likelihood of causing patient harm     Reason for Visit: medication management    Subjective:   Patient is being treated for depression and anxiety. Patient is observed on Klonopin 0.5mg 1/2 tab hs prn, seroquel 50mg po hs, and zoloft 25mg po daily. Patient tolerates the medications well, has been compliant and denies any side effects. Patient doing well overall since last appointment however talks about having high anxiety 3-4 weeks ago that was related to the stock market. Patient reports this was the first time in 15 years that he thought he needed to talk to someone about it. Patient reports he is \"back to normal\" currently and doing well with stable mood. Sleep and appetite has been stable. Good energy and motivation. Patient enjoys watching the stock market. Patient denies elevated moods, panic attacks, paranoia, ah/vh and si/hi.         Review Of Systems:     Constitutional negative   ENT Vertigo- testing pending    Cardiovascular Benign heart murmur   Respiratory negative   Gastrointestinal negative   Genitourinary negative   Musculoskeletal Hip and lower back pain- mild   Integumentary negative   Neurological negative   Endocrine negative     Past Medical History:   Patient Active Problem List   Diagnosis    Generalized anxiety disorder    Major depressive disorder, recurrent episode, in full remission ([facility]ly [facility])       Allergies: NKDA    Past Surgical History: cleft palate repair, knee repair s/p sport injury    Past Psychiatric History: Chriss Mayers    Family Psychiatric History: father committed suicide when pt was 43    Social History: single, lost girlfriend of over 30 years to Covid in 2020, 2 step-children, 3 step- grandkids    Substance Abuse History: pt denies    Traumatic History: emotional trauma ( found father who committed " suicide)    The following portions of the patient's history were reviewed and updated as appropriate: past family history, past medical history, past social history, past surgical history and problem list.    Objective:  There were no vitals filed for this visit.      Weight (last 2 days)       None            Mental status:  Appearance Casually dressed   Mood euthymic   Affect pleasant   Speech Normal rate, rhythm, and volume   Thought Processes Linear and goal directed   Associations intact   Hallucinations Denies any auditory or visual hallucinations   Thought Content No passive or active suicidal or homicidal ideation, intent, or plan   Orientation Oriented to person, place, time, and situation   Recent and Remote Memory Grossly intact   Attention Span and Concentration intact   Intellect Appears to be of Average Intelligence   Insight intact   Judgement intact   Muscle Strength No abnormal movements   Language Within normal limits   Fund of Knowledge Age appropriate   Pain mild     PHQ-A Depression Screening                Assessment/Plan:   1. Continue Seroquel 50 mg hs  2. Continue Zoloft 25 mg daily  3. Continue Klonopin 0.5 mg 1/2 tab hs prn  4. Call if any adverse medication side effects  5. Follow up in 3 months      Diagnosis: Major Depression, recurrent in full remission, BASSEM    Risks, Benefits And Possible Side Effects Of Medications:  Risks, benefits, and possible side effects of medications explained to patient and family, they verbalize understanding    Controlled Medication Discussion: Discussed with patient Black Box warning on concurrent use of benzodiazepines and opioid medications including sedation, respiratory depression, coma and death. Patient understands the risk of treatment with benzodiazepines in addition to opioids and wants to continue taking those medications.      Psychotherapy Provided: Supportive psychotherapy provided.     Yes  Medication education provided to Flavio.      Visit  Time    Visit Start Time: 2:30 PM  Visit Stop Time: 2:50 PM  Total Visit Duration:  20 minutes

## 2024-03-01 ENCOUNTER — HOSPITAL ENCOUNTER (OUTPATIENT)
Dept: HOSPITAL 99 - MRI 3T | Age: 79
End: 2024-03-01
Payer: MEDICARE

## 2024-03-01 DIAGNOSIS — G31.84: Primary | ICD-10-CM

## 2024-03-01 DIAGNOSIS — R42: ICD-10-CM

## 2024-03-01 PROCEDURE — A9575 INJ GADOTERATE MEGLUMI 0.1ML: HCPCS

## 2024-04-05 VITALS — DIASTOLIC BLOOD PRESSURE: 95 MMHG | SYSTOLIC BLOOD PRESSURE: 163 MMHG | RESPIRATION RATE: 10 BRPM

## 2024-04-05 VITALS — SYSTOLIC BLOOD PRESSURE: 176 MMHG | RESPIRATION RATE: 16 BRPM | DIASTOLIC BLOOD PRESSURE: 98 MMHG

## 2024-04-05 VITALS — DIASTOLIC BLOOD PRESSURE: 98 MMHG | RESPIRATION RATE: 23 BRPM | SYSTOLIC BLOOD PRESSURE: 184 MMHG

## 2024-04-05 VITALS — RESPIRATION RATE: 18 BRPM | DIASTOLIC BLOOD PRESSURE: 87 MMHG | SYSTOLIC BLOOD PRESSURE: 167 MMHG

## 2024-04-05 VITALS — SYSTOLIC BLOOD PRESSURE: 147 MMHG | DIASTOLIC BLOOD PRESSURE: 129 MMHG

## 2024-04-05 VITALS — DIASTOLIC BLOOD PRESSURE: 84 MMHG | RESPIRATION RATE: 19 BRPM | SYSTOLIC BLOOD PRESSURE: 162 MMHG

## 2024-04-05 VITALS — SYSTOLIC BLOOD PRESSURE: 165 MMHG | DIASTOLIC BLOOD PRESSURE: 90 MMHG

## 2024-04-05 VITALS — RESPIRATION RATE: 17 BRPM

## 2024-04-05 LAB
ALBUMIN SERPL-MCNC: 4.2 G/DL (ref 3.5–5)
ALP SERPL-CCNC: 72 U/L (ref 38–126)
ALT SERPL-CCNC: 18 U/L (ref 0–50)
AST SERPL-CCNC: 23 U/L (ref 17–59)
BUN SERPL-MCNC: 22 MG/DL (ref 9–20)
CALCIUM SERPL-MCNC: 9.1 MG/DL (ref 8.4–10.2)
CHLORIDE SERPL-SCNC: 99 MMOL/L (ref 98–107)
CO2 SERPL-SCNC: 27 MMOL/L (ref 22–30)
EGFR: > 60
ERYTHROCYTE [DISTWIDTH] IN BLOOD BY AUTOMATED COUNT: 12.5 % (ref 11.5–14.5)
ESTIMATED CREATININE CLEARANCE: 64 ML/MIN
GLUCOSE SERPL-MCNC: 137 MG/DL (ref 70–99)
HCT VFR BLD AUTO: 37.2 % (ref 39–52)
HGB BLD-MCNC: 13.4 G/DL (ref 13–18)
MCHC RBC AUTO-ENTMCNC: 36 G/DL (ref 33–37)
MCV RBC AUTO: 89.6 FL (ref 80–94)
NRBC BLD AUTO-RTO: 0 %
PLATELET # BLD AUTO: 192 10^3/UL (ref 130–400)
POTASSIUM SERPL-SCNC: 4.1 MMOL/L (ref 3.5–5.1)
PROT SERPL-MCNC: 6.8 G/DL (ref 6.3–8.2)
SODIUM SERPL-SCNC: 133 MMOL/L (ref 135–145)
TROPONIN I SERPL-MCNC: < 0.012 NG/ML

## 2024-04-05 RX ADMIN — METOCLOPRAMIDE HYDROCHLORIDE 10 MG: 5 INJECTION INTRAMUSCULAR; INTRAVENOUS at 22:07

## 2024-04-06 VITALS — SYSTOLIC BLOOD PRESSURE: 162 MMHG | DIASTOLIC BLOOD PRESSURE: 94 MMHG | OXYGEN SATURATION: 97 % | HEART RATE: 94 BPM

## 2024-04-06 VITALS — SYSTOLIC BLOOD PRESSURE: 162 MMHG | DIASTOLIC BLOOD PRESSURE: 88 MMHG | RESPIRATION RATE: 22 BRPM

## 2024-04-06 VITALS — DIASTOLIC BLOOD PRESSURE: 73 MMHG | SYSTOLIC BLOOD PRESSURE: 131 MMHG | RESPIRATION RATE: 16 BRPM

## 2024-04-06 VITALS — SYSTOLIC BLOOD PRESSURE: 117 MMHG | RESPIRATION RATE: 16 BRPM | DIASTOLIC BLOOD PRESSURE: 73 MMHG

## 2024-04-06 VITALS — SYSTOLIC BLOOD PRESSURE: 141 MMHG | DIASTOLIC BLOOD PRESSURE: 72 MMHG | RESPIRATION RATE: 16 BRPM

## 2024-04-06 VITALS — DIASTOLIC BLOOD PRESSURE: 97 MMHG | RESPIRATION RATE: 18 BRPM | SYSTOLIC BLOOD PRESSURE: 161 MMHG

## 2024-04-06 VITALS — SYSTOLIC BLOOD PRESSURE: 162 MMHG | DIASTOLIC BLOOD PRESSURE: 64 MMHG

## 2024-04-06 VITALS — SYSTOLIC BLOOD PRESSURE: 131 MMHG | RESPIRATION RATE: 21 BRPM | DIASTOLIC BLOOD PRESSURE: 86 MMHG

## 2024-04-06 VITALS — DIASTOLIC BLOOD PRESSURE: 64 MMHG | SYSTOLIC BLOOD PRESSURE: 162 MMHG

## 2024-04-06 VITALS — RESPIRATION RATE: 16 BRPM | DIASTOLIC BLOOD PRESSURE: 92 MMHG | SYSTOLIC BLOOD PRESSURE: 159 MMHG

## 2024-04-06 VITALS — RESPIRATION RATE: 17 BRPM | DIASTOLIC BLOOD PRESSURE: 73 MMHG | SYSTOLIC BLOOD PRESSURE: 134 MMHG

## 2024-04-06 VITALS — RESPIRATION RATE: 16 BRPM | SYSTOLIC BLOOD PRESSURE: 161 MMHG | DIASTOLIC BLOOD PRESSURE: 95 MMHG

## 2024-04-06 VITALS — DIASTOLIC BLOOD PRESSURE: 76 MMHG | RESPIRATION RATE: 14 BRPM | SYSTOLIC BLOOD PRESSURE: 127 MMHG

## 2024-04-06 VITALS — DIASTOLIC BLOOD PRESSURE: 72 MMHG | RESPIRATION RATE: 16 BRPM | SYSTOLIC BLOOD PRESSURE: 125 MMHG

## 2024-04-06 VITALS — RESPIRATION RATE: 18 BRPM | SYSTOLIC BLOOD PRESSURE: 135 MMHG | DIASTOLIC BLOOD PRESSURE: 76 MMHG

## 2024-04-06 VITALS — DIASTOLIC BLOOD PRESSURE: 91 MMHG | SYSTOLIC BLOOD PRESSURE: 165 MMHG | RESPIRATION RATE: 16 BRPM

## 2024-04-06 VITALS — SYSTOLIC BLOOD PRESSURE: 133 MMHG | HEART RATE: 84 BPM | DIASTOLIC BLOOD PRESSURE: 76 MMHG | RESPIRATION RATE: 16 BRPM

## 2024-04-06 LAB
BUN SERPL-MCNC: 19 MG/DL (ref 9–20)
CALCIUM SERPL-MCNC: 8.9 MG/DL (ref 8.4–10.2)
CHLORIDE SERPL-SCNC: 99 MMOL/L (ref 98–107)
CO2 SERPL-SCNC: 28 MMOL/L (ref 22–30)
EGFR: > 60
ERYTHROCYTE [DISTWIDTH] IN BLOOD BY AUTOMATED COUNT: 12.4 % (ref 11.5–14.5)
ESTIMATED CREATININE CLEARANCE: 73 ML/MIN
GLUCOSE SERPL-MCNC: 115 MG/DL (ref 70–99)
HCT VFR BLD AUTO: 36.8 % (ref 39–52)
HGB BLD-MCNC: 12.8 G/DL (ref 13–18)
MCHC RBC AUTO-ENTMCNC: 34.8 G/DL (ref 33–37)
MCV RBC AUTO: 91.5 FL (ref 80–94)
PLATELET # BLD AUTO: 199 10^3/UL (ref 130–400)
POTASSIUM SERPL-SCNC: 4 MMOL/L (ref 3.5–5.1)
SODIUM SERPL-SCNC: 134 MMOL/L (ref 135–145)

## 2024-04-06 RX ADMIN — FINASTERIDE 5 MG: 5 TABLET, FILM COATED ORAL at 08:29

## 2024-04-06 RX ADMIN — SODIUM CHLORIDE 1000: 900 INJECTION, SOLUTION INTRAVENOUS at 18:42

## 2024-04-06 RX ADMIN — DIAZEPAM 5 MG: 5 TABLET ORAL at 16:12

## 2024-04-06 RX ADMIN — DIAZEPAM 5 MG: 2 TABLET ORAL at 08:37

## 2024-04-06 RX ADMIN — CLOPIDOGREL BISULFATE 75 MG: 75 TABLET ORAL at 08:29

## 2024-04-06 RX ADMIN — MECLIZINE HYDROCHLORIDE 12.5 MG: 12.5 TABLET ORAL at 05:37

## 2024-04-06 RX ADMIN — SODIUM CHLORIDE 1000: 900 INJECTION, SOLUTION INTRAVENOUS at 05:35

## 2024-04-06 RX ADMIN — ATORVASTATIN CALCIUM 40 MG: 40 TABLET, FILM COATED ORAL at 20:57

## 2024-04-06 RX ADMIN — MECLIZINE HYDROCHLORIDE 12.5 MG: 12.5 TABLET ORAL at 13:42

## 2024-04-06 RX ADMIN — TAMSULOSIN HYDROCHLORIDE 0.4 MG: 0.4 CAPSULE ORAL at 08:29

## 2024-04-06 RX ADMIN — LORAZEPAM 1 MG: 2 INJECTION INTRAMUSCULAR; INTRAVENOUS at 00:37

## 2024-04-06 RX ADMIN — DIAZEPAM 5 MG: 5 TABLET ORAL at 20:58

## 2024-04-06 RX ADMIN — QUETIAPINE 12.5 MG: 25 TABLET, FILM COATED ORAL at 20:56

## 2024-04-07 VITALS — SYSTOLIC BLOOD PRESSURE: 159 MMHG | RESPIRATION RATE: 16 BRPM | DIASTOLIC BLOOD PRESSURE: 93 MMHG

## 2024-04-07 VITALS — HEART RATE: 88 BPM | SYSTOLIC BLOOD PRESSURE: 131 MMHG | DIASTOLIC BLOOD PRESSURE: 72 MMHG

## 2024-04-07 VITALS — RESPIRATION RATE: 16 BRPM | SYSTOLIC BLOOD PRESSURE: 151 MMHG | DIASTOLIC BLOOD PRESSURE: 96 MMHG

## 2024-04-07 VITALS — HEART RATE: 106 BPM | DIASTOLIC BLOOD PRESSURE: 71 MMHG | SYSTOLIC BLOOD PRESSURE: 126 MMHG

## 2024-04-07 VITALS — DIASTOLIC BLOOD PRESSURE: 56 MMHG | SYSTOLIC BLOOD PRESSURE: 143 MMHG | RESPIRATION RATE: 17 BRPM

## 2024-04-07 VITALS — DIASTOLIC BLOOD PRESSURE: 90 MMHG | RESPIRATION RATE: 17 BRPM | SYSTOLIC BLOOD PRESSURE: 145 MMHG

## 2024-04-07 VITALS — DIASTOLIC BLOOD PRESSURE: 94 MMHG | SYSTOLIC BLOOD PRESSURE: 151 MMHG | RESPIRATION RATE: 16 BRPM

## 2024-04-07 VITALS — DIASTOLIC BLOOD PRESSURE: 89 MMHG | SYSTOLIC BLOOD PRESSURE: 157 MMHG | RESPIRATION RATE: 18 BRPM

## 2024-04-07 RX ADMIN — DIAZEPAM 5 MG: 5 TABLET ORAL at 08:46

## 2024-04-07 RX ADMIN — MECLIZINE HYDROCHLORIDE 12.5 MG: 12.5 TABLET ORAL at 21:22

## 2024-04-07 RX ADMIN — SODIUM CHLORIDE 1000: 900 INJECTION, SOLUTION INTRAVENOUS at 05:01

## 2024-04-07 RX ADMIN — CLOPIDOGREL BISULFATE 75 MG: 75 TABLET ORAL at 08:46

## 2024-04-07 RX ADMIN — ATORVASTATIN CALCIUM 40 MG: 40 TABLET, FILM COATED ORAL at 21:20

## 2024-04-07 RX ADMIN — QUETIAPINE 12.5 MG: 25 TABLET, FILM COATED ORAL at 21:19

## 2024-04-07 RX ADMIN — ONDANSETRON HYDROCHLORIDE 4 MG: 2 SOLUTION INTRAMUSCULAR; INTRAVENOUS at 19:55

## 2024-04-07 RX ADMIN — ACETAMINOPHEN 650 MG: 325 TABLET ORAL at 15:57

## 2024-04-07 RX ADMIN — TAMSULOSIN HYDROCHLORIDE 0.4 MG: 0.4 CAPSULE ORAL at 08:46

## 2024-04-07 RX ADMIN — FINASTERIDE 5 MG: 5 TABLET, FILM COATED ORAL at 08:46

## 2024-04-08 VITALS — SYSTOLIC BLOOD PRESSURE: 147 MMHG | DIASTOLIC BLOOD PRESSURE: 85 MMHG | RESPIRATION RATE: 18 BRPM

## 2024-04-08 VITALS — RESPIRATION RATE: 18 BRPM | DIASTOLIC BLOOD PRESSURE: 79 MMHG | SYSTOLIC BLOOD PRESSURE: 140 MMHG

## 2024-04-08 VITALS — DIASTOLIC BLOOD PRESSURE: 93 MMHG | SYSTOLIC BLOOD PRESSURE: 152 MMHG | RESPIRATION RATE: 18 BRPM

## 2024-04-08 VITALS — DIASTOLIC BLOOD PRESSURE: 83 MMHG | RESPIRATION RATE: 16 BRPM | SYSTOLIC BLOOD PRESSURE: 145 MMHG

## 2024-04-08 VITALS — RESPIRATION RATE: 18 BRPM | DIASTOLIC BLOOD PRESSURE: 83 MMHG | SYSTOLIC BLOOD PRESSURE: 135 MMHG | HEART RATE: 76 BPM

## 2024-04-08 VITALS — RESPIRATION RATE: 16 BRPM | SYSTOLIC BLOOD PRESSURE: 160 MMHG | DIASTOLIC BLOOD PRESSURE: 90 MMHG

## 2024-04-08 LAB — GLUCOSE - POINT OF CARE: 126 MG/DL (ref 70–99)

## 2024-04-08 RX ADMIN — TAMSULOSIN HYDROCHLORIDE 0.4 MG: 0.4 CAPSULE ORAL at 09:11

## 2024-04-08 RX ADMIN — CLOPIDOGREL BISULFATE 75 MG: 75 TABLET ORAL at 09:11

## 2024-04-08 RX ADMIN — ACETAMINOPHEN 650 MG: 325 TABLET ORAL at 02:32

## 2024-04-08 RX ADMIN — QUETIAPINE 12.5 MG: 25 TABLET, FILM COATED ORAL at 21:29

## 2024-04-08 RX ADMIN — SODIUM CHLORIDE 500: 900 INJECTION, SOLUTION INTRAVENOUS at 19:22

## 2024-04-08 RX ADMIN — MECLIZINE HYDROCHLORIDE 12.5 MG: 12.5 TABLET ORAL at 09:11

## 2024-04-08 RX ADMIN — ACETAMINOPHEN 650 MG: 325 TABLET ORAL at 09:11

## 2024-04-08 RX ADMIN — FINASTERIDE 5 MG: 5 TABLET, FILM COATED ORAL at 09:11

## 2024-04-08 RX ADMIN — ATORVASTATIN CALCIUM 40 MG: 40 TABLET, FILM COATED ORAL at 21:29

## 2024-04-08 RX ADMIN — SERTRALINE HYDROCHLORIDE 25 MG: 25 TABLET ORAL at 11:14

## 2024-04-09 VITALS — DIASTOLIC BLOOD PRESSURE: 79 MMHG | RESPIRATION RATE: 18 BRPM | SYSTOLIC BLOOD PRESSURE: 149 MMHG

## 2024-04-09 VITALS — DIASTOLIC BLOOD PRESSURE: 108 MMHG | HEART RATE: 98 BPM | SYSTOLIC BLOOD PRESSURE: 178 MMHG

## 2024-04-09 VITALS — SYSTOLIC BLOOD PRESSURE: 165 MMHG | RESPIRATION RATE: 18 BRPM | DIASTOLIC BLOOD PRESSURE: 91 MMHG

## 2024-04-09 VITALS — SYSTOLIC BLOOD PRESSURE: 178 MMHG | DIASTOLIC BLOOD PRESSURE: 100 MMHG | RESPIRATION RATE: 18 BRPM

## 2024-04-09 VITALS — DIASTOLIC BLOOD PRESSURE: 86 MMHG | SYSTOLIC BLOOD PRESSURE: 136 MMHG | RESPIRATION RATE: 18 BRPM

## 2024-04-09 VITALS — DIASTOLIC BLOOD PRESSURE: 88 MMHG | SYSTOLIC BLOOD PRESSURE: 152 MMHG | RESPIRATION RATE: 18 BRPM

## 2024-04-09 VITALS — DIASTOLIC BLOOD PRESSURE: 94 MMHG | SYSTOLIC BLOOD PRESSURE: 184 MMHG | RESPIRATION RATE: 20 BRPM

## 2024-04-09 RX ADMIN — QUETIAPINE 12.5 MG: 25 TABLET, FILM COATED ORAL at 21:01

## 2024-04-09 RX ADMIN — TAMSULOSIN HYDROCHLORIDE 0.4 MG: 0.4 CAPSULE ORAL at 08:19

## 2024-04-09 RX ADMIN — PREDNISONE 40 MG: 10 TABLET ORAL at 08:19

## 2024-04-09 RX ADMIN — FINASTERIDE 5 MG: 5 TABLET, FILM COATED ORAL at 08:19

## 2024-04-09 RX ADMIN — SERTRALINE HYDROCHLORIDE 25 MG: 25 TABLET ORAL at 08:19

## 2024-04-09 RX ADMIN — ATORVASTATIN CALCIUM 40 MG: 40 TABLET, FILM COATED ORAL at 21:01

## 2024-04-09 RX ADMIN — CLOPIDOGREL BISULFATE 75 MG: 75 TABLET ORAL at 08:19

## 2024-04-10 ENCOUNTER — HOSPITAL ENCOUNTER (INPATIENT)
Dept: HOSPITAL 99 - 4 EAST ACU | Age: 79
LOS: 3 days | Discharge: NURSING, REHAB OR LONG-TERM CARE | DRG: 641 | End: 2024-04-13
Payer: MEDICARE

## 2024-04-10 VITALS — RESPIRATION RATE: 18 BRPM | SYSTOLIC BLOOD PRESSURE: 145 MMHG | DIASTOLIC BLOOD PRESSURE: 86 MMHG

## 2024-04-10 VITALS — SYSTOLIC BLOOD PRESSURE: 158 MMHG | DIASTOLIC BLOOD PRESSURE: 91 MMHG | RESPIRATION RATE: 18 BRPM

## 2024-04-10 VITALS — RESPIRATION RATE: 18 BRPM | DIASTOLIC BLOOD PRESSURE: 89 MMHG | SYSTOLIC BLOOD PRESSURE: 172 MMHG

## 2024-04-10 VITALS — RESPIRATION RATE: 16 BRPM | DIASTOLIC BLOOD PRESSURE: 92 MMHG | SYSTOLIC BLOOD PRESSURE: 170 MMHG

## 2024-04-10 VITALS — SYSTOLIC BLOOD PRESSURE: 136 MMHG | DIASTOLIC BLOOD PRESSURE: 87 MMHG | OXYGEN SATURATION: 96 % | HEART RATE: 101 BPM

## 2024-04-10 VITALS — BODY MASS INDEX: 24.6 KG/M2 | BODY MASS INDEX: 22.7 KG/M2

## 2024-04-10 VITALS — DIASTOLIC BLOOD PRESSURE: 93 MMHG | SYSTOLIC BLOOD PRESSURE: 142 MMHG | RESPIRATION RATE: 19 BRPM

## 2024-04-10 VITALS — SYSTOLIC BLOOD PRESSURE: 155 MMHG | RESPIRATION RATE: 16 BRPM | DIASTOLIC BLOOD PRESSURE: 83 MMHG

## 2024-04-10 DIAGNOSIS — N40.1: ICD-10-CM

## 2024-04-10 DIAGNOSIS — E87.1: Primary | ICD-10-CM

## 2024-04-10 DIAGNOSIS — G43.909: ICD-10-CM

## 2024-04-10 DIAGNOSIS — Z79.02: ICD-10-CM

## 2024-04-10 DIAGNOSIS — Z79.82: ICD-10-CM

## 2024-04-10 DIAGNOSIS — Z86.73: ICD-10-CM

## 2024-04-10 DIAGNOSIS — H55.00: ICD-10-CM

## 2024-04-10 DIAGNOSIS — Z60.2: ICD-10-CM

## 2024-04-10 DIAGNOSIS — F32.A: ICD-10-CM

## 2024-04-10 DIAGNOSIS — I10: ICD-10-CM

## 2024-04-10 DIAGNOSIS — R33.8: ICD-10-CM

## 2024-04-10 DIAGNOSIS — Z87.730: ICD-10-CM

## 2024-04-10 DIAGNOSIS — H81.20: ICD-10-CM

## 2024-04-10 RX ADMIN — PREDNISONE 40 MG: 10 TABLET ORAL at 09:21

## 2024-04-10 RX ADMIN — TAMSULOSIN HYDROCHLORIDE 0.4 MG: 0.4 CAPSULE ORAL at 09:21

## 2024-04-10 RX ADMIN — QUETIAPINE 12.5 MG: 25 TABLET, FILM COATED ORAL at 20:47

## 2024-04-10 RX ADMIN — SERTRALINE HYDROCHLORIDE 25 MG: 25 TABLET ORAL at 09:21

## 2024-04-10 RX ADMIN — CLOPIDOGREL BISULFATE 75 MG: 75 TABLET ORAL at 09:21

## 2024-04-10 RX ADMIN — ATORVASTATIN CALCIUM 40 MG: 40 TABLET, FILM COATED ORAL at 20:48

## 2024-04-10 RX ADMIN — FINASTERIDE 5 MG: 5 TABLET, FILM COATED ORAL at 09:21

## 2024-04-10 SDOH — SOCIAL STABILITY - SOCIAL INSECURITY: PROBLEMS RELATED TO LIVING ALONE: Z60.2

## 2024-04-11 VITALS — DIASTOLIC BLOOD PRESSURE: 84 MMHG | SYSTOLIC BLOOD PRESSURE: 141 MMHG | RESPIRATION RATE: 16 BRPM

## 2024-04-11 VITALS — OXYGEN SATURATION: 96 % | HEART RATE: 80 BPM | DIASTOLIC BLOOD PRESSURE: 69 MMHG | SYSTOLIC BLOOD PRESSURE: 134 MMHG

## 2024-04-11 VITALS — DIASTOLIC BLOOD PRESSURE: 90 MMHG | RESPIRATION RATE: 18 BRPM | SYSTOLIC BLOOD PRESSURE: 153 MMHG

## 2024-04-11 VITALS — SYSTOLIC BLOOD PRESSURE: 147 MMHG | DIASTOLIC BLOOD PRESSURE: 67 MMHG | RESPIRATION RATE: 16 BRPM

## 2024-04-11 RX ADMIN — TAMSULOSIN HYDROCHLORIDE 0.4 MG: 0.4 CAPSULE ORAL at 08:33

## 2024-04-11 RX ADMIN — ATORVASTATIN CALCIUM 40 MG: 40 TABLET, FILM COATED ORAL at 21:56

## 2024-04-11 RX ADMIN — FINASTERIDE 5 MG: 5 TABLET, FILM COATED ORAL at 08:33

## 2024-04-11 RX ADMIN — PREDNISONE 30 MG: 10 TABLET ORAL at 08:32

## 2024-04-11 RX ADMIN — QUETIAPINE 12.5 MG: 25 TABLET, FILM COATED ORAL at 21:56

## 2024-04-11 RX ADMIN — SERTRALINE HYDROCHLORIDE 25 MG: 25 TABLET ORAL at 08:33

## 2024-04-11 RX ADMIN — CLOPIDOGREL BISULFATE 75 MG: 75 TABLET ORAL at 08:33

## 2024-04-12 VITALS — RESPIRATION RATE: 14 BRPM | SYSTOLIC BLOOD PRESSURE: 132 MMHG | DIASTOLIC BLOOD PRESSURE: 77 MMHG

## 2024-04-12 VITALS — SYSTOLIC BLOOD PRESSURE: 182 MMHG | RESPIRATION RATE: 16 BRPM | DIASTOLIC BLOOD PRESSURE: 94 MMHG

## 2024-04-12 VITALS — RESPIRATION RATE: 14 BRPM | SYSTOLIC BLOOD PRESSURE: 162 MMHG | DIASTOLIC BLOOD PRESSURE: 91 MMHG

## 2024-04-12 VITALS — SYSTOLIC BLOOD PRESSURE: 152 MMHG | HEART RATE: 86 BPM | DIASTOLIC BLOOD PRESSURE: 83 MMHG

## 2024-04-12 VITALS — RESPIRATION RATE: 14 BRPM | DIASTOLIC BLOOD PRESSURE: 77 MMHG | SYSTOLIC BLOOD PRESSURE: 132 MMHG

## 2024-04-12 VITALS — SYSTOLIC BLOOD PRESSURE: 162 MMHG | RESPIRATION RATE: 14 BRPM | DIASTOLIC BLOOD PRESSURE: 91 MMHG

## 2024-04-12 VITALS — RESPIRATION RATE: 18 BRPM | SYSTOLIC BLOOD PRESSURE: 138 MMHG | DIASTOLIC BLOOD PRESSURE: 95 MMHG

## 2024-04-12 RX ADMIN — TAMSULOSIN HYDROCHLORIDE 0.4 MG: 0.4 CAPSULE ORAL at 08:06

## 2024-04-12 RX ADMIN — SERTRALINE HYDROCHLORIDE 25 MG: 25 TABLET ORAL at 08:06

## 2024-04-12 RX ADMIN — FINASTERIDE 5 MG: 5 TABLET, FILM COATED ORAL at 08:06

## 2024-04-12 RX ADMIN — PREDNISONE 30 MG: 10 TABLET ORAL at 08:06

## 2024-04-12 RX ADMIN — BETHANECHOL CHLORIDE 12.5 MG: 25 TABLET ORAL at 16:43

## 2024-04-12 RX ADMIN — ATORVASTATIN CALCIUM 40 MG: 40 TABLET, FILM COATED ORAL at 21:53

## 2024-04-12 RX ADMIN — QUETIAPINE 12.5 MG: 25 TABLET, FILM COATED ORAL at 21:53

## 2024-04-12 RX ADMIN — CLOPIDOGREL BISULFATE 75 MG: 75 TABLET ORAL at 08:06

## 2024-04-13 VITALS — SYSTOLIC BLOOD PRESSURE: 147 MMHG | RESPIRATION RATE: 18 BRPM | DIASTOLIC BLOOD PRESSURE: 67 MMHG

## 2024-04-13 VITALS — SYSTOLIC BLOOD PRESSURE: 153 MMHG | DIASTOLIC BLOOD PRESSURE: 89 MMHG | RESPIRATION RATE: 18 BRPM

## 2024-04-13 RX ADMIN — SERTRALINE HYDROCHLORIDE 25 MG: 25 TABLET ORAL at 07:55

## 2024-04-13 RX ADMIN — FINASTERIDE 5 MG: 5 TABLET, FILM COATED ORAL at 07:55

## 2024-04-13 RX ADMIN — BETHANECHOL CHLORIDE 12.5 MG: 25 TABLET ORAL at 07:56

## 2024-04-13 RX ADMIN — TAMSULOSIN HYDROCHLORIDE 0.4 MG: 0.4 CAPSULE ORAL at 07:55

## 2024-04-13 RX ADMIN — PREDNISONE 20 MG: 10 TABLET ORAL at 07:55

## 2024-04-13 RX ADMIN — CLOPIDOGREL BISULFATE 75 MG: 75 TABLET ORAL at 07:55

## 2024-04-14 ENCOUNTER — NURSING HOME VISIT (OUTPATIENT)
Dept: FAMILY MEDICINE CLINIC | Facility: CLINIC | Age: 79
End: 2024-04-14
Payer: MEDICARE

## 2024-04-14 DIAGNOSIS — H81.23 VESTIBULAR NEURONITIS OF BOTH EARS: Primary | ICD-10-CM

## 2024-04-14 DIAGNOSIS — Z86.73 HISTORY OF CVA (CEREBROVASCULAR ACCIDENT): ICD-10-CM

## 2024-04-14 DIAGNOSIS — R42 VERTIGO: ICD-10-CM

## 2024-04-14 DIAGNOSIS — R33.9 URINARY RETENTION: ICD-10-CM

## 2024-04-14 PROBLEM — G43.109 MIGRAINE WITH AURA AND WITHOUT STATUS MIGRAINOSUS, NOT INTRACTABLE: Status: ACTIVE | Noted: 2024-04-14

## 2024-04-14 PROCEDURE — 99306 1ST NF CARE HIGH MDM 50: CPT | Performed by: FAMILY MEDICINE

## 2024-04-14 NOTE — PROGRESS NOTES
[facility]  [address] [place] [address]  Facility: [facility]    NAME: Flavio Cuevas  AGE: 79 y.o. SEX: male    DATE OF ENCOUNTER: 4/14/2024    Code status:  Full Code    Assessment and Plan     1. Vestibular neuronitis of both ears    2. History of CVA (cerebrovascular accident)    3. Vertigo    4. Urinary retention        All medications and routine orders were reviewed and updated as needed.    Plan discussed with: Family member    Chief Complaint     Seen for admission at Nursing Home    History of Present Illness     79-year-old male here from [facility] after admission for severe vertigo and tinnitus.  He was diagnosed with vestibular neuronitis.  He has been treated with some steroids with some improvement in his tinnitus and vertigo.  Patient has a history of migraine headaches as well as a prior CVA.  He notes unsteadiness with ambulation and transfers.  He has a history of mood disorder and his brother who is a physician is his medical power of .  He notes that his bowel habits are stable.  He was found during this hospitalization to have urinary retention and currently has a catheter in place.  His appetite is at baseline.  He denies any dyspnea.  He denies any swallowing issues.  He does note some residual visual deficits from his previous CVA    HISTORY:  History reviewed. No pertinent past medical history.  History reviewed. No pertinent surgical history.  History reviewed. No pertinent family history.  Social History     Socioeconomic History    Marital status: Single     Spouse name: None    Number of children: None    Years of education: None    Highest education level: None   Occupational History    None   Tobacco Use    Smoking status: Never    Smokeless tobacco: Never   Vaping Use    Vaping status: Never Used   Substance and Sexual Activity    Alcohol use: Not Currently    Drug use: Never    Sexual activity: Not Currently   Other Topics Concern     None   Social History Narrative    None     Social Determinants of Health     Financial Resource Strain: Not on file   Food Insecurity: Not on file   Transportation Needs: Not on file   Physical Activity: Not on file   Stress: Not on file   Social Connections: Not on file   Intimate Partner Violence: Not on file   Housing Stability: Not on file       Allergies:  No Known Allergies    Review of Systems     Review of Systems   Constitutional:  Negative for activity change, appetite change, chills, diaphoresis, fatigue and unexpected weight change.   HENT:  Positive for tinnitus. Negative for congestion, ear discharge, ear pain, hearing loss, nosebleeds and rhinorrhea.    Eyes:  Positive for visual disturbance. Negative for pain, redness and itching.   Respiratory:  Negative for cough, choking, chest tightness and shortness of breath.    Cardiovascular:  Negative for chest pain and leg swelling.   Gastrointestinal:  Negative for abdominal pain, blood in stool, constipation, diarrhea and nausea.   Endocrine: Negative for cold intolerance, polydipsia and polyphagia.   Genitourinary:  Positive for difficulty urinating. Negative for dysuria, frequency, hematuria and urgency.   Musculoskeletal:  Negative for arthralgias, back pain, gait problem, joint swelling, neck pain and neck stiffness.   Skin:  Negative for color change and rash.   Allergic/Immunologic: Negative for environmental allergies and food allergies.   Neurological:  Positive for weakness and light-headedness. Negative for dizziness, tremors, seizures, speech difficulty, numbness and headaches.   Hematological:  Negative for adenopathy. Does not bruise/bleed easily.   Psychiatric/Behavioral:  Positive for dysphoric mood. Negative for behavioral problems, hallucinations and self-injury.        Medications and orders     All medications reviewed and updated in shelter EMR.      Objective     Vitals: per nursing home record    Physical Exam  Constitutional:        General: He is not in acute distress.     Appearance: He is well-developed. He is not diaphoretic.   HENT:      Head: Normocephalic and atraumatic.      Right Ear: External ear normal.      Left Ear: External ear normal.      Nose: Nose normal.      Mouth/Throat:      Pharynx: No oropharyngeal exudate.   Eyes:      General: No scleral icterus.        Right eye: No discharge.         Left eye: No discharge.      Conjunctiva/sclera: Conjunctivae normal.      Pupils: Pupils are equal, round, and reactive to light.   Neck:      Thyroid: No thyromegaly.   Cardiovascular:      Rate and Rhythm: Normal rate and regular rhythm.      Heart sounds: Normal heart sounds. No murmur heard.  Pulmonary:      Effort: Pulmonary effort is normal.      Breath sounds: Normal breath sounds. No wheezing or rales.   Abdominal:      General: Bowel sounds are normal.      Palpations: Abdomen is soft. There is no mass.      Tenderness: There is no abdominal tenderness. There is no guarding.   Musculoskeletal:         General: No tenderness. Normal range of motion.      Cervical back: Normal range of motion and neck supple.   Lymphadenopathy:      Cervical: No cervical adenopathy.   Skin:     General: Skin is warm and dry.   Neurological:      Mental Status: He is alert and oriented to person, place, and time.      Motor: Weakness present.      Deep Tendon Reflexes: Reflexes are normal and symmetric.   Psychiatric:         Thought Content: Thought content normal.         Judgment: Judgment normal.         Pertinent Laboratory/Diagnostic Studies:   The following labs/studies were reviewed please see chart or hospital paperwork for details.  Diagnostic studies from the hospital were reviewed    - Admit for PT OT and medical therapy.  We will attempt a void trial later in the week.  He will benefit from therapy to improve his safety awareness and gait training    Mahin Conte DO  4/14/2024 1:08 PM

## 2024-04-15 ENCOUNTER — NURSING HOME VISIT (OUTPATIENT)
Dept: FAMILY MEDICINE CLINIC | Facility: CLINIC | Age: 79
End: 2024-04-15
Payer: MEDICARE

## 2024-04-15 DIAGNOSIS — H81.23 VESTIBULAR NEURONITIS OF BOTH EARS: Primary | ICD-10-CM

## 2024-04-15 DIAGNOSIS — F33.42 MAJOR DEPRESSIVE DISORDER, RECURRENT EPISODE, IN FULL REMISSION (HCC): ICD-10-CM

## 2024-04-15 DIAGNOSIS — R33.9 URINARY RETENTION: ICD-10-CM

## 2024-04-15 DIAGNOSIS — G43.109 MIGRAINE WITH AURA AND WITHOUT STATUS MIGRAINOSUS, NOT INTRACTABLE: ICD-10-CM

## 2024-04-15 DIAGNOSIS — Z86.73 HISTORY OF CVA (CEREBROVASCULAR ACCIDENT): ICD-10-CM

## 2024-04-15 PROCEDURE — 99308 SBSQ NF CARE LOW MDM 20: CPT | Performed by: FAMILY MEDICINE

## 2024-04-15 NOTE — PROGRESS NOTES
[facility]  [address] [place] [address]  Facility: [facility]    NAME: Flavio Cuevas  AGE: 79 y.o. SEX: male    DATE OF ENCOUNTER: 4/15/2024    Code status:  DNR w/ Hospitalization    Assessment and Plan     1. Vestibular neuronitis of both ears    2. Migraine with aura and without status migrainosus, not intractable    3. Urinary retention    4. Major depressive disorder, recurrent episode, in full remission (HCC)    5. History of CVA (cerebrovascular accident)        All medications and routine orders were reviewed and updated as needed.    Plan discussed with: Family member    Chief Complaint     Interim evaluation    History of Present Illness     The patient is putting a good effort for his with therapy.  He has less lightheadedness and less vertigo.  He occasionally will have some tinnitus.  He is completing a weaning of the prednisone.  Bowels have been sluggish.  Continues with Pinzon catheter in place.    The following portions of the patient's history were reviewed and updated as appropriate: current medications, past family history, past medical history, past social history, past surgical history and problem list.    Allergies:  No Known Allergies    Review of Systems     Review of Systems   Constitutional:  Negative for activity change, appetite change, chills, diaphoresis, fatigue and unexpected weight change.   HENT:  Positive for tinnitus. Negative for congestion, ear discharge, ear pain, hearing loss, nosebleeds and rhinorrhea.    Eyes:  Negative for pain, redness, itching and visual disturbance.   Respiratory:  Negative for cough, choking, chest tightness and shortness of breath.    Cardiovascular:  Negative for chest pain and leg swelling.   Gastrointestinal:  Negative for abdominal pain, blood in stool, constipation, diarrhea and nausea.   Endocrine: Negative for cold intolerance, polydipsia and polyphagia.   Genitourinary:  Negative for dysuria, frequency,  hematuria and urgency.   Musculoskeletal:  Negative for arthralgias, back pain, gait problem, joint swelling, neck pain and neck stiffness.   Skin:  Negative for color change and rash.   Allergic/Immunologic: Negative for environmental allergies and food allergies.   Neurological:  Positive for dizziness and light-headedness. Negative for tremors, seizures, speech difficulty, numbness and headaches.   Hematological:  Negative for adenopathy. Does not bruise/bleed easily.   Psychiatric/Behavioral:  Negative for behavioral problems, dysphoric mood, hallucinations and self-injury.        Medications and orders     All medications reviewed and updated in [facility] EMR.      Objective     Vitals: per nursing home records    Physical Exam  Constitutional:       General: He is not in acute distress.     Appearance: He is well-developed. He is not diaphoretic.   HENT:      Head: Normocephalic and atraumatic.      Right Ear: External ear normal.      Left Ear: External ear normal.      Nose: Nose normal.      Mouth/Throat:      Pharynx: No oropharyngeal exudate.   Eyes:      General: No scleral icterus.        Right eye: No discharge.         Left eye: No discharge.      Conjunctiva/sclera: Conjunctivae normal.      Pupils: Pupils are equal, round, and reactive to light.   Neck:      Thyroid: No thyromegaly.   Cardiovascular:      Rate and Rhythm: Normal rate and regular rhythm.      Heart sounds: Normal heart sounds. No murmur heard.  Pulmonary:      Effort: Pulmonary effort is normal.      Breath sounds: Normal breath sounds. No wheezing or rales.   Abdominal:      General: Bowel sounds are normal.      Palpations: Abdomen is soft. There is no mass.      Tenderness: There is no abdominal tenderness. There is no guarding.   Musculoskeletal:         General: No tenderness. Normal range of motion.      Cervical back: Normal range of motion and neck supple.   Lymphadenopathy:      Cervical: No cervical adenopathy.   Skin:      General: Skin is warm and dry.   Neurological:      Mental Status: He is alert and oriented to person, place, and time.      Motor: Weakness present.      Deep Tendon Reflexes: Reflexes are normal and symmetric.         Pertinent Laboratory/Diagnostic Studies:     The following studies were reviewed please see chart or hospital paperwork for details.    Space for lab dictation no diagnostics    - Maintain the current therapy plan and medication regimen.  Labs are pending for Wednesday    Mahin Conte DO  4/15/2024 1:39 PM

## 2024-04-19 ENCOUNTER — NURSING HOME VISIT (OUTPATIENT)
Dept: FAMILY MEDICINE CLINIC | Facility: CLINIC | Age: 79
End: 2024-04-19
Payer: MEDICARE

## 2024-04-19 DIAGNOSIS — R42 VERTIGO: ICD-10-CM

## 2024-04-19 DIAGNOSIS — H81.23 VESTIBULAR NEURONITIS OF BOTH EARS: Primary | ICD-10-CM

## 2024-04-19 DIAGNOSIS — R33.9 URINARY RETENTION: ICD-10-CM

## 2024-04-19 DIAGNOSIS — Z86.73 HISTORY OF CVA (CEREBROVASCULAR ACCIDENT): ICD-10-CM

## 2024-04-19 PROCEDURE — 99309 SBSQ NF CARE MODERATE MDM 30: CPT | Performed by: FAMILY MEDICINE

## 2024-04-19 NOTE — PROGRESS NOTES
[facility]  [address] [place] [address]  Facility: [facility]    NAME: Flavio Cuevas  AGE: 79 y.o. SEX: male    DATE OF ENCOUNTER: 4/19/2024    Code status:  Full Code    Assessment and Plan     1. Vestibular neuronitis of both ears    2. Vertigo    3. History of CVA (cerebrovascular accident)    4. Urinary retention        All medications and routine orders were reviewed and updated as needed.    Plan discussed with: Patient    Chief Complaint     Interim evaluation    History of Present Illness     The patient notes improvement in his vertigo symptoms.  He has received 1 dose of nursing since being here.  I am concerned about the antihistaminic effects of this medication and its contribution to his urinary retention.  Today we will begin a void trial.  He notes no headache.  He is performing well in therapy and is an enthusiastic participant.  Bowels are moving.    The following portions of the patient's history were reviewed and updated as appropriate: current medications, past family history, past medical history, past social history, past surgical history and problem list.    Allergies:  No Known Allergies    Review of Systems     Review of Systems   Constitutional:  Negative for activity change, appetite change, chills, diaphoresis, fatigue and unexpected weight change.   HENT:  Negative for congestion, ear discharge, ear pain, hearing loss, nosebleeds and rhinorrhea.    Eyes:  Negative for pain, redness, itching and visual disturbance.   Respiratory:  Negative for cough, choking, chest tightness and shortness of breath.    Cardiovascular:  Negative for chest pain and leg swelling.   Gastrointestinal:  Negative for abdominal pain, blood in stool, constipation, diarrhea and nausea.   Endocrine: Negative for cold intolerance, polydipsia and polyphagia.   Genitourinary:  Positive for difficulty urinating. Negative for dysuria, frequency, hematuria and urgency.    Musculoskeletal:  Negative for arthralgias, back pain, gait problem, joint swelling, neck pain and neck stiffness.   Skin:  Negative for color change and rash.   Allergic/Immunologic: Negative for environmental allergies and food allergies.   Neurological:  Positive for dizziness. Negative for tremors, seizures, speech difficulty, numbness and headaches.   Hematological:  Negative for adenopathy. Does not bruise/bleed easily.   Psychiatric/Behavioral:  Negative for behavioral problems, dysphoric mood, hallucinations and self-injury.        Medications and orders     All medications reviewed and updated in skilled nursing EMR.      Objective     Vitals: per nursing home records    Physical Exam  Constitutional:       General: He is not in acute distress.     Appearance: He is well-developed. He is not diaphoretic.   HENT:      Head: Normocephalic and atraumatic.      Right Ear: External ear normal.      Left Ear: External ear normal.      Nose: Nose normal.      Mouth/Throat:      Pharynx: No oropharyngeal exudate.   Eyes:      General: No scleral icterus.        Right eye: No discharge.         Left eye: No discharge.      Conjunctiva/sclera: Conjunctivae normal.      Pupils: Pupils are equal, round, and reactive to light.   Neck:      Thyroid: No thyromegaly.   Cardiovascular:      Rate and Rhythm: Normal rate and regular rhythm.      Heart sounds: Normal heart sounds. No murmur heard.  Pulmonary:      Effort: Pulmonary effort is normal.      Breath sounds: Normal breath sounds. No wheezing or rales.   Abdominal:      General: Bowel sounds are normal.      Palpations: Abdomen is soft. There is no mass.      Tenderness: There is no abdominal tenderness. There is no guarding.   Musculoskeletal:         General: No tenderness. Normal range of motion.      Cervical back: Normal range of motion and neck supple.   Lymphadenopathy:      Cervical: No cervical adenopathy.   Skin:     General: Skin is warm and dry.    Neurological:      Mental Status: He is alert and oriented to person, place, and time.      Motor: Weakness present.      Deep Tendon Reflexes: Reflexes are normal and symmetric.   Psychiatric:         Thought Content: Thought content normal.         Judgment: Judgment normal.         Pertinent Laboratory/Diagnostic Studies:     The following studies were reviewed please see chart or hospital paperwork for details.    Space for lab dictation labs are normal    - We will begin a voiding trial.  We will avoid antihistamine and anticholinergic medications.  He will continue with finasteride tamsulosin and bethanechol.    Mahin Conte,   4/19/2024 1:40 PM

## 2024-04-22 ENCOUNTER — NURSING HOME VISIT (OUTPATIENT)
Dept: FAMILY MEDICINE CLINIC | Facility: CLINIC | Age: 79
End: 2024-04-22
Payer: MEDICARE

## 2024-04-22 DIAGNOSIS — R42 VERTIGO: ICD-10-CM

## 2024-04-22 DIAGNOSIS — H81.23 VESTIBULAR NEURONITIS OF BOTH EARS: Primary | ICD-10-CM

## 2024-04-22 DIAGNOSIS — Z86.73 HISTORY OF CVA (CEREBROVASCULAR ACCIDENT): ICD-10-CM

## 2024-04-22 DIAGNOSIS — G43.109 MIGRAINE WITH AURA AND WITHOUT STATUS MIGRAINOSUS, NOT INTRACTABLE: ICD-10-CM

## 2024-04-22 DIAGNOSIS — R33.9 URINARY RETENTION: ICD-10-CM

## 2024-04-22 DIAGNOSIS — F41.1 GENERALIZED ANXIETY DISORDER: ICD-10-CM

## 2024-04-22 PROCEDURE — 99308 SBSQ NF CARE LOW MDM 20: CPT | Performed by: FAMILY MEDICINE

## 2024-04-22 NOTE — PROGRESS NOTES
[facility]  [address] [place] [address]  Facility: [facility]    NAME: Flavio Cuevas  AGE: 79 y.o. SEX: male    DATE OF ENCOUNTER: 4/22/2024    Code status:  DNR w/ Hospitalization    Assessment and Plan     1. Vestibular neuronitis of both ears    2. Urinary retention    3. Generalized anxiety disorder    4. Migraine with aura and without status migrainosus, not intractable    5. History of CVA (cerebrovascular accident)    6. Vertigo        All medications and routine orders were reviewed and updated as needed.    Plan discussed with: Family member    Chief Complaint     Interim evaluation    History of Present Illness     The patient failed his void trial over the weekend and the catheter is back in.  His bowels are sluggish.his vertigo and tinnitus are better.  No dyspnea.  Appetite is ok    The following portions of the patient's history were reviewed and updated as appropriate: current medications, past family history, past medical history, past social history, past surgical history and problem list.    Allergies:  No Known Allergies    Review of Systems     Review of Systems   Constitutional:  Negative for activity change, appetite change, chills, diaphoresis, fatigue and unexpected weight change.   HENT:  Positive for tinnitus. Negative for congestion, ear discharge, ear pain, hearing loss, nosebleeds and rhinorrhea.    Eyes:  Negative for pain, redness, itching and visual disturbance.   Respiratory:  Negative for cough, choking, chest tightness and shortness of breath.    Cardiovascular:  Negative for chest pain and leg swelling.   Gastrointestinal:  Negative for abdominal pain, blood in stool, constipation, diarrhea and nausea.   Endocrine: Negative for cold intolerance, polydipsia and polyphagia.   Genitourinary:  Positive for difficulty urinating. Negative for dysuria, frequency, hematuria and urgency.   Musculoskeletal:  Negative for arthralgias, back pain, gait  problem, joint swelling, neck pain and neck stiffness.   Skin:  Negative for color change and rash.   Allergic/Immunologic: Negative for environmental allergies and food allergies.   Neurological:  Positive for weakness and light-headedness. Negative for dizziness, tremors, seizures, speech difficulty, numbness and headaches.   Hematological:  Negative for adenopathy. Does not bruise/bleed easily.   Psychiatric/Behavioral:  Negative for behavioral problems, dysphoric mood, hallucinations and self-injury.        Medications and orders     All medications reviewed and updated in skilled nursing EMR.      Objective     Vitals: per nursing home records    Physical Exam  Constitutional:       General: He is not in acute distress.     Appearance: He is well-developed. He is not diaphoretic.   HENT:      Head: Normocephalic and atraumatic.      Right Ear: External ear normal.      Left Ear: External ear normal.      Nose: Nose normal.      Mouth/Throat:      Pharynx: No oropharyngeal exudate.   Eyes:      General: No scleral icterus.        Right eye: No discharge.         Left eye: No discharge.      Conjunctiva/sclera: Conjunctivae normal.      Pupils: Pupils are equal, round, and reactive to light.   Neck:      Thyroid: No thyromegaly.   Cardiovascular:      Rate and Rhythm: Normal rate and regular rhythm.      Heart sounds: Normal heart sounds. No murmur heard.  Pulmonary:      Effort: Pulmonary effort is normal.      Breath sounds: Normal breath sounds. No wheezing or rales.   Abdominal:      General: Bowel sounds are normal.      Palpations: Abdomen is soft. There is no mass.      Tenderness: There is no abdominal tenderness. There is no guarding.   Musculoskeletal:         General: No tenderness. Normal range of motion.      Cervical back: Normal range of motion and neck supple.   Lymphadenopathy:      Cervical: No cervical adenopathy.   Skin:     General: Skin is warm and dry.   Neurological:      Mental Status: He is  alert and oriented to person, place, and time.      Motor: Weakness present.      Deep Tendon Reflexes: Reflexes are normal and symmetric.   Psychiatric:         Thought Content: Thought content normal.         Judgment: Judgment normal.         Pertinent Laboratory/Diagnostic Studies:     The following studies were reviewed please see chart or hospital paperwork for details.    Space for lab dictation no new studies    - use dietary habits to improve bowels.  Consider another void trial later this week.    Mahin Conte DO  4/22/2024 11:01 AM

## 2024-04-25 ENCOUNTER — NURSING HOME VISIT (OUTPATIENT)
Dept: FAMILY MEDICINE CLINIC | Facility: CLINIC | Age: 79
End: 2024-04-25
Payer: MEDICARE

## 2024-04-25 DIAGNOSIS — Z86.73 HISTORY OF CVA (CEREBROVASCULAR ACCIDENT): ICD-10-CM

## 2024-04-25 DIAGNOSIS — R42 VERTIGO: ICD-10-CM

## 2024-04-25 DIAGNOSIS — R33.9 URINARY RETENTION: ICD-10-CM

## 2024-04-25 DIAGNOSIS — H81.23 VESTIBULAR NEURONITIS OF BOTH EARS: Primary | ICD-10-CM

## 2024-04-25 DIAGNOSIS — G43.109 MIGRAINE WITH AURA AND WITHOUT STATUS MIGRAINOSUS, NOT INTRACTABLE: ICD-10-CM

## 2024-04-25 PROCEDURE — 99315 NF DSCHRG MGMT 30 MIN/LESS: CPT | Performed by: FAMILY MEDICINE

## 2024-04-25 NOTE — PROGRESS NOTES
[facility]  [address] [place] [address]  Facility: [facility]    NAME: Flavio Cuevas  AGE: 79 y.o. SEX: male    DATE OF ENCOUNTER: 4/25/2024    Code status:  Full Code    Assessment and Plan     1. Vestibular neuronitis of both ears    2. Urinary retention    3. Migraine with aura and without status migrainosus, not intractable    4. History of CVA (cerebrovascular accident)    5. Vertigo        All medications and routine orders were reviewed and updated as needed.    Plan discussed with: Family member    Chief Complaint     Interim evaluation    History of Present Illness     The scheduled for discharge tomorrow.  He continues with a catheter in his bladder.  He failed his first void trial.  He will need to follow-up with urology as an outpatient and VNA will need to see him for catheter care as well as to remove his catheter prior to his visit with urology.  I explained this at length to the patient.  He has done well from a therapy point of view and his vertigo is significantly improved.  He has no more tinnitus.  He has been ambulating with his walker.  His bowels are moving.  He finds prune juice very helpful.    The following portions of the patient's history were reviewed and updated as appropriate: current medications, past family history, past medical history, past social history, past surgical history and problem list.    Allergies:  No Known Allergies    Review of Systems     Review of Systems   Constitutional:  Negative for activity change, appetite change, chills, diaphoresis, fatigue and unexpected weight change.   HENT:  Positive for tinnitus. Negative for congestion, ear discharge, ear pain, hearing loss, nosebleeds and rhinorrhea.    Eyes:  Negative for pain, redness, itching and visual disturbance.   Respiratory:  Negative for cough, choking, chest tightness and shortness of breath.    Cardiovascular:  Negative for chest pain and leg swelling.    Gastrointestinal:  Negative for abdominal pain, blood in stool, constipation, diarrhea and nausea.   Endocrine: Negative for cold intolerance, polydipsia and polyphagia.   Genitourinary:  Positive for difficulty urinating. Negative for dysuria, frequency, hematuria and urgency.   Musculoskeletal:  Negative for arthralgias, back pain, gait problem, joint swelling, neck pain and neck stiffness.   Skin:  Negative for color change and rash.   Allergic/Immunologic: Negative for environmental allergies and food allergies.   Neurological:  Positive for weakness and light-headedness. Negative for dizziness, tremors, seizures, speech difficulty, numbness and headaches.   Hematological:  Negative for adenopathy. Does not bruise/bleed easily.   Psychiatric/Behavioral:  Negative for behavioral problems, dysphoric mood, hallucinations and self-injury.        Medications and orders     [facility]      Objective     Vitals: per nursing home records    Physical Exam  Constitutional:       General: He is not in acute distress.     Appearance: He is well-developed. He is not diaphoretic.   HENT:      Head: Normocephalic and atraumatic.      Right Ear: External ear normal.      Left Ear: External ear normal.      Nose: Nose normal.      Mouth/Throat:      Pharynx: No oropharyngeal exudate.   Eyes:      General: No scleral icterus.        Right eye: No discharge.         Left eye: No discharge.      Conjunctiva/sclera: Conjunctivae normal.      Pupils: Pupils are equal, round, and reactive to light.   Neck:      Thyroid: No thyromegaly.   Cardiovascular:      Rate and Rhythm: Normal rate and regular rhythm.      Heart sounds: Murmur heard.   Pulmonary:      Effort: Pulmonary effort is normal.      Breath sounds: Normal breath sounds. No wheezing or rales.   Abdominal:      General: Bowel sounds are normal.      Palpations: Abdomen is soft. There is no mass.      Tenderness: There is no  abdominal tenderness. There is no guarding.   Musculoskeletal:         General: No tenderness. Normal range of motion.      Cervical back: Normal range of motion and neck supple.   Lymphadenopathy:      Cervical: No cervical adenopathy.   Skin:     General: Skin is warm and dry.   Neurological:      Mental Status: He is alert and oriented to person, place, and time.      Motor: Weakness present.      Deep Tendon Reflexes: Reflexes are normal and symmetric.   Psychiatric:         Thought Content: Thought content normal.         Judgment: Judgment normal.         Pertinent Laboratory/Diagnostic Studies:     The following studies were reviewed please see chart or hospital paperwork for details.    Space for lab dictation no new diagnostics    - Discharge planning with careful VNA and urology coordination    Mahin Conte DO  4/25/2024 10:57 AM

## 2024-04-28 ENCOUNTER — HOSPITAL ENCOUNTER (EMERGENCY)
Dept: HOSPITAL 99 - EMR | Age: 79
Discharge: HOME | End: 2024-04-28
Payer: MEDICARE

## 2024-04-28 VITALS — RESPIRATION RATE: 17 BRPM | DIASTOLIC BLOOD PRESSURE: 76 MMHG | SYSTOLIC BLOOD PRESSURE: 110 MMHG

## 2024-04-28 DIAGNOSIS — T83.018A: Primary | ICD-10-CM

## 2024-04-28 DIAGNOSIS — R33.9: ICD-10-CM

## 2024-04-28 PROCEDURE — 51798 US URINE CAPACITY MEASURE: CPT

## 2024-04-28 PROCEDURE — 99283 EMERGENCY DEPT VISIT LOW MDM: CPT

## 2024-04-30 ENCOUNTER — HOSPITAL ENCOUNTER (EMERGENCY)
Dept: HOSPITAL 99 - EMR | Age: 79
Discharge: HOME | End: 2024-04-30
Payer: MEDICARE

## 2024-04-30 VITALS — SYSTOLIC BLOOD PRESSURE: 166 MMHG | DIASTOLIC BLOOD PRESSURE: 100 MMHG

## 2024-04-30 VITALS — RESPIRATION RATE: 24 BRPM
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
## 2024-04-30 DIAGNOSIS — T83.091A: Primary | ICD-10-CM

## 2024-04-30 DIAGNOSIS — I10: ICD-10-CM

## 2024-04-30 DIAGNOSIS — Z86.73: ICD-10-CM

## 2024-04-30 PROCEDURE — 99282 EMERGENCY DEPT VISIT SF MDM: CPT

## 2024-05-10 ENCOUNTER — IOP CHECK (OUTPATIENT)
Dept: URBAN - METROPOLITAN AREA CLINIC 79 | Facility: CLINIC | Age: 79
End: 2024-05-10

## 2024-05-10 DIAGNOSIS — H02.056: ICD-10-CM

## 2024-05-10 DIAGNOSIS — H02.054: ICD-10-CM

## 2024-05-10 DIAGNOSIS — H40.013: ICD-10-CM

## 2024-05-10 DIAGNOSIS — H52.03: ICD-10-CM

## 2024-05-10 PROCEDURE — 92015 DETERMINE REFRACTIVE STATE: CPT | Mod: GY

## 2024-05-10 PROCEDURE — 67820 REVISE EYELASHES: CPT

## 2024-05-10 PROCEDURE — 92012 INTRM OPH EXAM EST PATIENT: CPT | Mod: 25

## 2024-05-10 PROCEDURE — 92083 EXTENDED VISUAL FIELD XM: CPT

## 2024-05-10 ASSESSMENT — VISUAL ACUITY
OS_SC: 20/20
OD_SC: 20/30
OD_SC: J3
OS_SC: J2

## 2024-05-10 ASSESSMENT — TONOMETRY
OS_IOP_MMHG: 17
OD_IOP_MMHG: 15

## 2024-05-22 ENCOUNTER — OFFICE VISIT (OUTPATIENT)
Dept: PSYCHIATRY | Facility: CLINIC | Age: 79
End: 2024-05-22
Payer: MEDICARE

## 2024-05-22 DIAGNOSIS — F41.1 GENERALIZED ANXIETY DISORDER: ICD-10-CM

## 2024-05-22 DIAGNOSIS — F33.42 MAJOR DEPRESSIVE DISORDER, RECURRENT EPISODE, IN FULL REMISSION (HCC): ICD-10-CM

## 2024-05-22 PROCEDURE — 99213 OFFICE O/P EST LOW 20 MIN: CPT

## 2024-05-22 RX ORDER — CLONAZEPAM 0.5 MG/1
TABLET ORAL
Qty: 45 TABLET | Refills: 1 | Status: SHIPPED | OUTPATIENT
Start: 2024-05-22

## 2024-05-22 RX ORDER — SERTRALINE HYDROCHLORIDE 25 MG/1
TABLET, FILM COATED ORAL
Qty: 90 TABLET | Refills: 1 | Status: SHIPPED | OUTPATIENT
Start: 2024-05-22

## 2024-05-22 RX ORDER — QUETIAPINE FUMARATE 50 MG/1
TABLET, FILM COATED ORAL
Qty: 90 TABLET | Refills: 1 | Status: SHIPPED | OUTPATIENT
Start: 2024-05-22

## 2024-05-22 NOTE — PSYCH
"Psychiatric Medication Management - Behavioral Health   Flavio Cuevas 79 y.o. male MRN: [id number]        This note was not shared with the patient due to reasonable likelihood of causing patient harm     Reason for Visit: medication management    Subjective:   Patient is being treated for depression and anxiety. Patient is observed on Klonopin 0.5mg 1/2 tab hs prn, seroquel 50mg po hs, and zoloft 25mg po daily. Patient tolerates the medications well, has been compliant and denies any side effects. Patient reports he fell April 5th with damage to internal organs and was in the hospital for 27 days including rehab. He reports things are back to normal physically and now walks with a cane. He reports he had some trouble urinating due to a catheter in the hospital, however he is able to urinate and have bm without issue. He will be seeing a urologist next week. In addition, he will be seeing an ENT doctor to help with his vertigo. Patient states his balance is back to normal. Mood remains stable. Denies dysphoric moods including depression and anxiety. Sleep and appetite has been stable. Good energy and motivation. Patient enjoys watching the stock market. Patient denies elevated moods, panic attacks, paranoia, ah/vh and si/hi. Patient had a recent neuropsychological evaluation with Dr. Goldberg at [facility] and came to the conclusion: \"I do not see any evidence for a primary neurocognitive disorder at this time. Slow processing speed is not uncommon in significant psychiatric conditions and this is likely the causal factor.\"       Review Of Systems:     Constitutional negative   ENT Vertigo- testing pending    Cardiovascular Benign heart murmur   Respiratory negative   Gastrointestinal negative   Genitourinary negative   Musculoskeletal Hip and lower back pain- mild   Integumentary negative   Neurological negative   Endocrine negative     Past Medical History:   Patient Active Problem List "   Diagnosis    Generalized anxiety disorder    Major depressive disorder, recurrent episode, in full remission (HCC)    Vertigo    Vestibular neuronitis of both ears    Migraine with aura and without status migrainosus, not intractable    History of CVA (cerebrovascular accident)    Urinary retention       Allergies: NKDA    Past Surgical History: cleft palate repair, knee repair s/p sport injury    Past Psychiatric History: Chriss Albablanca    Family Psychiatric History: father committed suicide when pt was 43    Social History: single, lost girlfriend of over 30 years to Covid in 2020, 2 step-children, 3 step- grandkids    Substance Abuse History: pt denies    Traumatic History: emotional trauma ( found father who committed suicide)    The following portions of the patient's history were reviewed and updated as appropriate: past family history, past medical history, past social history, past surgical history and problem list.    Objective:  There were no vitals filed for this visit.      Weight (last 2 days)       None            Mental status:  Appearance Casually dressed   Mood euthymic   Affect pleasant   Speech Normal rate, rhythm, and volume   Thought Processes Linear and goal directed   Associations intact   Hallucinations Denies any auditory or visual hallucinations   Thought Content No passive or active suicidal or homicidal ideation, intent, or plan   Orientation Oriented to person, place, time, and situation   Recent and Remote Memory Grossly intact   Attention Span and Concentration intact   Intellect Appears to be of Average Intelligence   Insight intact   Judgement intact   Muscle Strength No abnormal movements   Language Within normal limits   Fund of Knowledge Age appropriate   Pain mild     PHQ-A Depression Screening                Assessment/Plan:   1. Continue Seroquel 50 mg hs  2. Continue Zoloft 25 mg daily  3. Continue Klonopin 0.5 mg 1/2 tab hs prn  4. Call if any adverse medication side  effects  5. Follow up in 3 months      Diagnosis: Major Depression, recurrent in full remission, BASSEM    Risks, Benefits And Possible Side Effects Of Medications:  Risks, benefits, and possible side effects of medications explained to patient and family, they verbalize understanding    Controlled Medication Discussion: Discussed with patient Black Box warning on concurrent use of benzodiazepines and opioid medications including sedation, respiratory depression, coma and death. Patient understands the risk of treatment with benzodiazepines in addition to opioids and wants to continue taking those medications.      Psychotherapy Provided: Supportive psychotherapy provided.     Yes  Medication education provided to Flavio.      Visit Time    Visit Start Time: 1:30 PM  Visit Stop Time: 1:50 PM  Total Visit Duration:  20 minutes

## 2024-06-10 ENCOUNTER — HOSPITAL ENCOUNTER (OUTPATIENT)
Dept: HOSPITAL 99 - RAD | Age: 79
End: 2024-06-10
Payer: MEDICARE

## 2024-06-10 DIAGNOSIS — R10.2: Primary | ICD-10-CM

## 2024-06-22 ENCOUNTER — HOSPITAL ENCOUNTER (OUTPATIENT)
Dept: HOSPITAL 99 - REG | Age: 79
End: 2024-06-22
Payer: MEDICARE

## 2024-06-22 DIAGNOSIS — E87.1: ICD-10-CM

## 2024-06-22 DIAGNOSIS — G45.9: Primary | ICD-10-CM

## 2024-06-22 DIAGNOSIS — D52.9: ICD-10-CM

## 2024-06-22 DIAGNOSIS — Z86.73: ICD-10-CM

## 2024-06-22 LAB
ALBUMIN SERPL-MCNC: 4 G/DL (ref 3.5–5)
ALP SERPL-CCNC: 65 U/L (ref 38–126)
ALT SERPL-CCNC: 21 U/L (ref 0–50)
AST SERPL-CCNC: 26 U/L (ref 17–59)
BUN SERPL-MCNC: 22 MG/DL (ref 9–20)
CALCIUM SERPL-MCNC: 9.1 MG/DL (ref 8.4–10.2)
CHLORIDE SERPL-SCNC: 98 MMOL/L (ref 98–107)
CO2 SERPL-SCNC: 29 MMOL/L (ref 22–30)
EGFR: > 60
ERYTHROCYTE [DISTWIDTH] IN BLOOD BY AUTOMATED COUNT: 13 % (ref 11.5–14.5)
GLUCOSE SERPL-MCNC: 88 MG/DL (ref 70–99)
HCT VFR BLD AUTO: 36.7 % (ref 39–52)
HDLC SERPL-MCNC: 68 MG/DL
HGB BLD-MCNC: 12.6 G/DL (ref 13–18)
LDLC SERPL CALC-MCNC: 100 MG/DL
MCHC RBC AUTO-ENTMCNC: 34.3 G/DL (ref 33–37)
MCV RBC AUTO: 93.1 FL (ref 80–94)
NRBC BLD AUTO-RTO: 0 %
PLATELET # BLD AUTO: 190 10^3/UL (ref 130–400)
POTASSIUM SERPL-SCNC: 4.7 MMOL/L (ref 3.5–5.1)
PROT SERPL-MCNC: 6.6 G/DL (ref 6.3–8.2)
SODIUM SERPL-SCNC: 134 MMOL/L (ref 135–145)
TSH SERPL DL<=0.05 MIU/L-ACNC: 2.68 UIU/ML (ref 0.47–4.68)
VIT B12 SERPL-MCNC: 608 PG/ML (ref 239–931)
VLDLC SERPL CALC-MCNC: 10 MG/DL (ref 0–30)

## 2024-06-26 ENCOUNTER — HOSPITAL ENCOUNTER (OUTPATIENT)
Dept: HOSPITAL 99 - HWRAD | Age: 79
End: 2024-06-26
Payer: MEDICARE

## 2024-06-26 DIAGNOSIS — Z86.73: Primary | ICD-10-CM

## 2024-06-26 DIAGNOSIS — G45.9: ICD-10-CM

## 2024-07-02 ENCOUNTER — HOSPITAL ENCOUNTER (OUTPATIENT)
Dept: HOSPITAL 99 - RAD | Age: 79
End: 2024-07-02
Payer: MEDICARE

## 2024-07-02 DIAGNOSIS — G45.9: Primary | ICD-10-CM

## 2024-08-16 ENCOUNTER — IOP CHECK (OUTPATIENT)
Dept: URBAN - METROPOLITAN AREA CLINIC 79 | Facility: CLINIC | Age: 79
End: 2024-08-16

## 2024-08-16 DIAGNOSIS — H02.831: ICD-10-CM

## 2024-08-16 DIAGNOSIS — H25.813: ICD-10-CM

## 2024-08-16 DIAGNOSIS — H16.223: ICD-10-CM

## 2024-08-16 DIAGNOSIS — H40.013: ICD-10-CM

## 2024-08-16 DIAGNOSIS — H02.834: ICD-10-CM

## 2024-08-16 PROCEDURE — 92012 INTRM OPH EXAM EST PATIENT: CPT

## 2024-08-16 PROCEDURE — 92083 EXTENDED VISUAL FIELD XM: CPT

## 2024-08-16 PROCEDURE — 92133 CPTRZD OPH DX IMG PST SGM ON: CPT

## 2024-08-16 ASSESSMENT — TONOMETRY
OS_IOP_MMHG: 13
OD_IOP_MMHG: 14
OD_IOP_MMHG: 15
OS_IOP_MMHG: 14

## 2024-08-16 ASSESSMENT — VISUAL ACUITY
OD_SC: 20/30-2
OS_SC: 20/30-2

## 2024-08-22 ENCOUNTER — OFFICE VISIT (OUTPATIENT)
Dept: PSYCHIATRY | Facility: CLINIC | Age: 79
End: 2024-08-22
Payer: MEDICARE

## 2024-08-22 DIAGNOSIS — F41.1 GENERALIZED ANXIETY DISORDER: ICD-10-CM

## 2024-08-22 DIAGNOSIS — F33.42 MAJOR DEPRESSIVE DISORDER, RECURRENT EPISODE, IN FULL REMISSION (HCC): ICD-10-CM

## 2024-08-22 PROCEDURE — 99212 OFFICE O/P EST SF 10 MIN: CPT

## 2024-08-22 RX ORDER — QUETIAPINE FUMARATE 50 MG/1
TABLET, FILM COATED ORAL
Qty: 90 TABLET | Refills: 1 | Status: SHIPPED | OUTPATIENT
Start: 2024-08-22

## 2024-08-22 RX ORDER — SERTRALINE HYDROCHLORIDE 25 MG/1
TABLET, FILM COATED ORAL
Qty: 90 TABLET | Refills: 1 | Status: SHIPPED | OUTPATIENT
Start: 2024-08-22

## 2024-08-22 RX ORDER — CLONAZEPAM 0.5 MG/1
TABLET ORAL
Qty: 45 TABLET | Refills: 1 | Status: SHIPPED | OUTPATIENT
Start: 2024-08-22

## 2024-08-22 NOTE — PSYCH
Psychiatric Medication Management - Behavioral Health   Flavio Cuevas 79 y.o. male MRN: [id number]        This note was not shared with the patient due to reasonable likelihood of causing patient harm     Reason for Visit: medication management    Subjective:   Patient is being treated for depression and anxiety. Patient is observed on Klonopin 0.5mg 1/2 tab hs prn, seroquel 50mg po hs, and zoloft 25mg po daily. Patient tolerates the medications well, has been compliant and denies any side effects. Patient reports doing well since last appointment. Mood is stable. He is slowly recovering from his fall in April and continues to ambulate with a cane. Denies dysphoric moods including depression and anxiety. Sleep and appetite has been stable. Good energy and motivation. Patient enjoys watching the stock market. Patient denies elevated moods, panic attacks, paranoia, ah/vh and si/hi.      Review Of Systems:     Constitutional negative   ENT Vertigo- testing pending    Cardiovascular Benign heart murmur   Respiratory negative   Gastrointestinal negative   Genitourinary negative   Musculoskeletal Hip and lower back pain- mild   Integumentary negative   Neurological negative   Endocrine negative     Past Medical History:   Patient Active Problem List   Diagnosis    Generalized anxiety disorder    Major depressive disorder, recurrent episode, in full remission (HCC)    Vertigo    Vestibular neuronitis of both ears    Migraine with aura and without status migrainosus, not intractable    History of CVA (cerebrovascular accident)    Urinary retention       Allergies: NKDA    Past Surgical History: cleft palate repair, knee repair s/p sport injury    Past Psychiatric History: Chriss Mayers    Family Psychiatric History: father committed suicide when pt was 43    Social History: single, lost girlfriend of over 30 years to Covid in 2020, 2 step-children, 3 step- grandkids    Substance Abuse History: pt denies    Traumatic History:  emotional trauma ( found father who committed suicide)    The following portions of the patient's history were reviewed and updated as appropriate: past family history, past medical history, past social history, past surgical history and problem list.    Objective:  There were no vitals filed for this visit.      Weight (last 2 days)       None            Mental status:  Appearance Casually dressed   Mood euthymic   Affect pleasant   Speech Normal rate, rhythm, and volume   Thought Processes Linear and goal directed   Associations intact   Hallucinations Denies any auditory or visual hallucinations   Thought Content No passive or active suicidal or homicidal ideation, intent, or plan   Orientation Oriented to person, place, time, and situation   Recent and Remote Memory Grossly intact   Attention Span and Concentration intact   Intellect Appears to be of Average Intelligence   Insight intact   Judgement intact   Muscle Strength No abnormal movements   Language Within normal limits   Fund of Knowledge Age appropriate   Pain mild     PHQ-A Depression Screening                Assessment/Plan:   1. Continue Seroquel 50 mg hs  2. Continue Zoloft 25 mg daily  3. Continue Klonopin 0.5 mg 1/2 tab hs prn  4. Call if any adverse medication side effects  5. Follow up in 3 months      Diagnosis: Major Depression, recurrent in full remission, BASSEM    Risks, Benefits And Possible Side Effects Of Medications:  Risks, benefits, and possible side effects of medications explained to patient and family, they verbalize understanding    Controlled Medication Discussion: Discussed with patient Black Box warning on concurrent use of benzodiazepines and opioid medications including sedation, respiratory depression, coma and death. Patient understands the risk of treatment with benzodiazepines in addition to opioids and wants to continue taking those medications.      Psychotherapy Provided: Supportive psychotherapy provided.      Yes  Medication education provided to Flavio.      Visit Time    Visit Start Time: 1:00 PM  Visit Stop Time: 1:15 PM  Total Visit Duration:  15 minutes

## 2024-10-16 ENCOUNTER — HOSPITAL ENCOUNTER (OUTPATIENT)
Dept: HOSPITAL 99 - RAD | Age: 79
End: 2024-10-16
Payer: MEDICARE

## 2024-10-16 DIAGNOSIS — M25.551: Primary | ICD-10-CM

## 2024-11-21 ENCOUNTER — OFFICE VISIT (OUTPATIENT)
Dept: PSYCHIATRY | Facility: CLINIC | Age: 79
End: 2024-11-21
Payer: MEDICARE

## 2024-11-21 DIAGNOSIS — F41.1 GENERALIZED ANXIETY DISORDER: ICD-10-CM

## 2024-11-21 DIAGNOSIS — F33.42 MAJOR DEPRESSIVE DISORDER, RECURRENT EPISODE, IN FULL REMISSION (HCC): ICD-10-CM

## 2024-11-21 PROCEDURE — 99214 OFFICE O/P EST MOD 30 MIN: CPT

## 2024-11-21 RX ORDER — QUETIAPINE FUMARATE 50 MG/1
TABLET, FILM COATED ORAL
Qty: 90 TABLET | Refills: 1 | Status: SHIPPED | OUTPATIENT
Start: 2024-11-21

## 2024-11-21 RX ORDER — CLONAZEPAM 0.5 MG/1
TABLET ORAL
Qty: 45 TABLET | Refills: 1 | Status: SHIPPED | OUTPATIENT
Start: 2024-11-21

## 2024-11-21 RX ORDER — SERTRALINE HYDROCHLORIDE 25 MG/1
TABLET, FILM COATED ORAL
Qty: 90 TABLET | Refills: 1 | Status: SHIPPED | OUTPATIENT
Start: 2024-11-21

## 2024-11-21 NOTE — PSYCH
Psychiatric Medication Management - Behavioral Health   Flavio Cuevas 79 y.o. male MRN: [id number]        This note was not shared with the patient due to reasonable likelihood of causing patient harm     Reason for Visit: medication management    Subjective:   Patient is being treated for depression and anxiety. Patient is observed on Klonopin 0.5mg 1/2 tab hs prn, seroquel 50mg po hs, and zoloft 25mg po daily. Patient tolerates the medications well, has been compliant and denies any side effects. Patient reports doing well since last appointment. Mood is stable. Denies dysphoric moods including depression and anxiety. Sleep and appetite has been stable. Good energy and motivation. Patient denies elevated moods, panic attacks, paranoia, ah/vh and si/hi.      Review Of Systems:     Constitutional negative   ENT Vertigo- testing pending    Cardiovascular Benign heart murmur   Respiratory negative   Gastrointestinal negative   Genitourinary negative   Musculoskeletal Hip and lower back pain- mild   Integumentary negative   Neurological negative   Endocrine negative     Past Medical History:   Patient Active Problem List   Diagnosis    Generalized anxiety disorder    Major depressive disorder, recurrent episode, in full remission ([facility])    Vertigo    Vestibular neuronitis of both ears    Migraine with aura and without status migrainosus, not intractable    History of CVA (cerebrovascular accident)    Urinary retention       Allergies: NKDA    Past Surgical History: cleft palate repair, knee repair s/p sport injury    Past Psychiatric History: Chriss Mayers    Family Psychiatric History: father committed suicide when pt was 43    Social History: single, lost girlfriend of over 30 years to Covid in 2020, 2 step-children, 3 step- grandkids    Substance Abuse History: pt denies    Traumatic History: emotional trauma ( found father who committed suicide)    The following portions of the patient's history were reviewed and  updated as appropriate: past family history, past medical history, past social history, past surgical history and problem list.    Objective:  There were no vitals filed for this visit.      Weight (last 2 days)       None            Mental status:  Appearance Casually dressed   Mood euthymic   Affect pleasant   Speech Normal rate, rhythm, and volume   Thought Processes Linear and goal directed   Associations intact   Hallucinations Denies any auditory or visual hallucinations   Thought Content No passive or active suicidal or homicidal ideation, intent, or plan   Orientation Oriented to person, place, time, and situation   Recent and Remote Memory Grossly intact   Attention Span and Concentration intact   Intellect Appears to be of Average Intelligence   Insight intact   Judgement intact   Muscle Strength No abnormal movements   Language Within normal limits   Fund of Knowledge Age appropriate   Pain mild     PHQ-A Depression Screening                Assessment/Plan:   1. Continue Seroquel 50 mg hs  2. Continue Zoloft 25 mg daily  3. Continue Klonopin 0.5 mg 1/2 tab hs prn  4. Call if any adverse medication side effects  5. Follow up in 3 months      Diagnosis: Major Depression, recurrent in full remission, BASSEM    Risks, Benefits And Possible Side Effects Of Medications:  Risks, benefits, and possible side effects of medications explained to patient and family, they verbalize understanding    Controlled Medication Discussion: Discussed with patient Black Box warning on concurrent use of benzodiazepines and opioid medications including sedation, respiratory depression, coma and death. Patient understands the risk of treatment with benzodiazepines in addition to opioids and wants to continue taking those medications.      Psychotherapy Provided: Supportive psychotherapy provided.     Yes  Medication education provided to Flavio.      Visit Time    Visit Start Time: 1:00 PM  Visit Stop Time: 1:20 PM  Total Visit  Duration:  20 minutes

## 2024-12-23 ENCOUNTER — PROBLEM (OUTPATIENT)
Dept: URBAN - METROPOLITAN AREA CLINIC 79 | Facility: CLINIC | Age: 79
End: 2024-12-23

## 2024-12-23 DIAGNOSIS — H25.813: ICD-10-CM

## 2024-12-23 DIAGNOSIS — H02.831: ICD-10-CM

## 2024-12-23 DIAGNOSIS — H02.834: ICD-10-CM

## 2024-12-23 DIAGNOSIS — H16.223: ICD-10-CM

## 2024-12-23 DIAGNOSIS — H40.013: ICD-10-CM

## 2024-12-23 PROCEDURE — 92012 INTRM OPH EXAM EST PATIENT: CPT

## 2024-12-23 ASSESSMENT — TONOMETRY
OD_IOP_MMHG: 14
OS_IOP_MMHG: 13

## 2024-12-23 ASSESSMENT — VISUAL ACUITY
OS_SC: 20/30
OD_SC: 20/30

## 2025-01-02 ENCOUNTER — HOSPITAL ENCOUNTER (OUTPATIENT)
Dept: HOSPITAL 99 - ED | Age: 80
Setting detail: OBSERVATION
LOS: 1 days | Discharge: HOME | End: 2025-01-03
Payer: MEDICARE

## 2025-01-02 VITALS — RESPIRATION RATE: 18 BRPM | DIASTOLIC BLOOD PRESSURE: 84 MMHG | SYSTOLIC BLOOD PRESSURE: 172 MMHG

## 2025-01-02 VITALS — RESPIRATION RATE: 16 BRPM | SYSTOLIC BLOOD PRESSURE: 170 MMHG | DIASTOLIC BLOOD PRESSURE: 113 MMHG

## 2025-01-02 VITALS — RESPIRATION RATE: 22 BRPM | DIASTOLIC BLOOD PRESSURE: 94 MMHG | SYSTOLIC BLOOD PRESSURE: 165 MMHG

## 2025-01-02 VITALS — DIASTOLIC BLOOD PRESSURE: 82 MMHG | SYSTOLIC BLOOD PRESSURE: 161 MMHG

## 2025-01-02 VITALS — SYSTOLIC BLOOD PRESSURE: 168 MMHG | DIASTOLIC BLOOD PRESSURE: 95 MMHG

## 2025-01-02 DIAGNOSIS — T39.015A: ICD-10-CM

## 2025-01-02 DIAGNOSIS — Z86.73: ICD-10-CM

## 2025-01-02 DIAGNOSIS — I10: ICD-10-CM

## 2025-01-02 DIAGNOSIS — Z79.02: ICD-10-CM
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
## 2025-01-02 DIAGNOSIS — Z79.82: ICD-10-CM

## 2025-01-02 DIAGNOSIS — R04.0: Primary | ICD-10-CM

## 2025-01-02 DIAGNOSIS — F41.9: ICD-10-CM

## 2025-01-02 DIAGNOSIS — Z79.52: ICD-10-CM

## 2025-01-02 DIAGNOSIS — I25.10: ICD-10-CM

## 2025-01-02 DIAGNOSIS — F42.9: ICD-10-CM

## 2025-01-02 DIAGNOSIS — T45.525A: ICD-10-CM

## 2025-01-02 DIAGNOSIS — N40.0: ICD-10-CM

## 2025-01-02 LAB
ALBUMIN SERPL-MCNC: 4.4 G/DL (ref 3.5–5)
ALP SERPL-CCNC: 66 U/L (ref 38–126)
ALT SERPL-CCNC: 22 U/L (ref 0–50)
AST SERPL-CCNC: 27 U/L (ref 17–59)
BUN SERPL-MCNC: 22 MG/DL (ref 9–20)
CALCIUM SERPL-MCNC: 8.9 MG/DL (ref 8.4–10.2)
CHLORIDE SERPL-SCNC: 98 MMOL/L (ref 98–107)
CO2 SERPL-SCNC: 28 MMOL/L (ref 22–30)
EGFR: > 60
ERYTHROCYTE [DISTWIDTH] IN BLOOD BY AUTOMATED COUNT: 12.6 % (ref 11.5–14.5)
GLUCOSE SERPL-MCNC: 114 MG/DL (ref 70–99)
HCT VFR BLD AUTO: 39.6 % (ref 39–52)
HGB BLD-MCNC: 13.6 G/DL (ref 13–18)
MCHC RBC AUTO-ENTMCNC: 34.3 G/DL (ref 33–37)
MCV RBC AUTO: 92.3 FL (ref 80–94)
NRBC BLD AUTO-RTO: 0 %
PLATELET # BLD AUTO: 188 10^3/UL (ref 130–400)
POTASSIUM SERPL-SCNC: 4 MMOL/L (ref 3.5–5.1)
PROT SERPL-MCNC: 7.1 G/DL (ref 6.3–8.2)
SODIUM SERPL-SCNC: 135 MMOL/L (ref 135–145)

## 2025-01-02 PROCEDURE — G0378 HOSPITAL OBSERVATION PER HR: HCPCS

## 2025-01-02 RX ADMIN — AMOXICILLIN AND CLAVULANATE POTASSIUM 1 TABLET: 875; 125 TABLET, FILM COATED ORAL at 21:23

## 2025-01-02 RX ADMIN — ACETAMINOPHEN 1000 MG: 500 TABLET ORAL at 21:21

## 2025-01-03 VITALS — SYSTOLIC BLOOD PRESSURE: 173 MMHG | DIASTOLIC BLOOD PRESSURE: 89 MMHG | RESPIRATION RATE: 19 BRPM

## 2025-01-03 VITALS — SYSTOLIC BLOOD PRESSURE: 164 MMHG | DIASTOLIC BLOOD PRESSURE: 86 MMHG

## 2025-01-03 LAB
ERYTHROCYTE [DISTWIDTH] IN BLOOD BY AUTOMATED COUNT: 12.4 % (ref 11.5–14.5)
HCT VFR BLD AUTO: 37.2 % (ref 39–52)
HGB BLD-MCNC: 12.9 G/DL (ref 13–18)
INR PPP: 0.98
MCHC RBC AUTO-ENTMCNC: 34.7 G/DL (ref 33–37)
MCV RBC AUTO: 91.2 FL (ref 80–94)
PLATELET # BLD AUTO: 192 10^3/UL (ref 130–400)
PROTHROMBIN TIME: 13.5 SEC (ref 11.4–14.6)

## 2025-01-03 RX ADMIN — FINASTERIDE 5 MG: 5 TABLET, FILM COATED ORAL at 10:21

## 2025-01-03 RX ADMIN — CYCLOSPORINE 1 DROPS: 0.5 EMULSION OPHTHALMIC at 10:13

## 2025-01-03 RX ADMIN — TAMSULOSIN HYDROCHLORIDE 0.4 MG: 0.4 CAPSULE ORAL at 10:21

## 2025-01-03 RX ADMIN — SERTRALINE HYDROCHLORIDE 25 MG: 25 TABLET ORAL at 10:21

## 2025-01-03 RX ADMIN — Medication 500 MG: at 10:14

## 2025-01-03 RX ADMIN — ACETAMINOPHEN 650 MG: 325 TABLET ORAL at 10:14

## 2025-01-03 RX ADMIN — AMOXICILLIN AND CLAVULANATE POTASSIUM 1 TABLET: 875; 125 TABLET, FILM COATED ORAL at 10:21

## 2025-01-03 RX ADMIN — MULTIVITAMIN TABLET 1 TABLET: TABLET at 10:21

## 2025-01-17 DIAGNOSIS — Z79.899 ENCOUNTER FOR LONG-TERM (CURRENT) USE OF MEDICATIONS: Primary | ICD-10-CM

## 2025-02-20 ENCOUNTER — OFFICE VISIT (OUTPATIENT)
Dept: PSYCHIATRY | Facility: CLINIC | Age: 80
End: 2025-02-20
Payer: MEDICARE

## 2025-02-20 DIAGNOSIS — F33.42 MAJOR DEPRESSIVE DISORDER, RECURRENT EPISODE, IN FULL REMISSION (HCC): ICD-10-CM

## 2025-02-20 DIAGNOSIS — F41.1 GENERALIZED ANXIETY DISORDER: ICD-10-CM

## 2025-02-20 PROCEDURE — 99214 OFFICE O/P EST MOD 30 MIN: CPT

## 2025-02-20 RX ORDER — QUETIAPINE FUMARATE 50 MG/1
TABLET, FILM COATED ORAL
Qty: 90 TABLET | Refills: 0 | Status: SHIPPED | OUTPATIENT
Start: 2025-02-20

## 2025-02-20 RX ORDER — CLONAZEPAM 0.5 MG/1
TABLET ORAL
Qty: 45 TABLET | Refills: 0 | Status: SHIPPED | OUTPATIENT
Start: 2025-02-20

## 2025-02-20 RX ORDER — SERTRALINE HYDROCHLORIDE 25 MG/1
TABLET, FILM COATED ORAL
Qty: 90 TABLET | Refills: 0 | Status: SHIPPED | OUTPATIENT
Start: 2025-02-20

## 2025-02-20 NOTE — PSYCH
Psychiatric Medication Management - Behavioral Health   Flavio Cuevas 79 y.o. male MRN: [id number]        This note was not shared with the patient due to reasonable likelihood of causing patient harm     Assessment/Plan:   1. Continue Seroquel 50 mg hs  2. Continue Zoloft 25 mg daily  3. Continue Klonopin 0.5 mg 1/2 tab hs prn  4. Call if any adverse medication side effects  5. Follow up in 3 months      Diagnosis: Major Depression, recurrent in full remission, BASSEM    Reason for Visit: medication management    Subjective:   Patient is being treated for depression and anxiety. Patient is observed on Klonopin 0.5mg 1/2 tab hs prn, seroquel 50mg po hs, and zoloft 25mg po daily. Patient tolerates the medications well, has been compliant and denies any side effects. Patient reports doing well since last appointment. Mood is stable. Denies dysphoric moods including depression and anxiety. Sleep and appetite has been stable. Good energy and motivation. Patient denies elevated moods, panic attacks, paranoia, ah/vh and si/hi.        Review Of Systems:     Constitutional negative   ENT Vertigo- testing pending    Cardiovascular Benign heart murmur   Respiratory negative   Gastrointestinal negative   Genitourinary negative   Musculoskeletal Hip and lower back pain- mild   Integumentary negative   Neurological negative   Endocrine negative     Past Medical History:   Patient Active Problem List   Diagnosis    Generalized anxiety disorder    Major depressive disorder, recurrent episode, in full remission (HCC)    Vertigo    Vestibular neuronitis of both ears    Migraine with aura and without status migrainosus, not intractable    History of CVA (cerebrovascular accident)    Urinary retention       Allergies: NKDA    Past Surgical History: cleft palate repair, knee repair s/p sport injury    Past Psychiatric History: Chriss Mayers    Family Psychiatric History: father committed suicide when pt was 43    Social History: single,  lost girlfriend of over 30 years to Covid in 2020, 2 step-children, 3 step- grandkids    Substance Abuse History: pt denies    Traumatic History: emotional trauma ( found father who committed suicide)    The following portions of the patient's history were reviewed and updated as appropriate: past family history, past medical history, past social history, past surgical history and problem list.    Objective:  There were no vitals filed for this visit.      Weight (last 2 days)       None            Mental status:  Appearance Casually dressed   Mood euthymic   Affect pleasant   Speech Normal rate, rhythm, and volume   Thought Processes Linear and goal directed   Associations intact   Hallucinations Denies any auditory or visual hallucinations   Thought Content No passive or active suicidal or homicidal ideation, intent, or plan   Orientation Oriented to person, place, time, and situation   Recent and Remote Memory Grossly intact   Attention Span and Concentration intact   Intellect Appears to be of Average Intelligence   Insight intact   Judgement intact   Muscle Strength No abnormal movements   Language Within normal limits   Fund of Knowledge Age appropriate   Pain mild     PHQ-A Depression Screening                  Risks, Benefits And Possible Side Effects Of Medications:  Risks, benefits, and possible side effects of medications explained to patient and family, they verbalize understanding    Controlled Medication Discussion: Discussed with patient Black Box warning on concurrent use of benzodiazepines and opioid medications including sedation, respiratory depression, coma and death. Patient understands the risk of treatment with benzodiazepines in addition to opioids and wants to continue taking those medications.      Psychotherapy Provided: Supportive psychotherapy provided.     Yes  Medication education provided to Flavio.      Visit Time    Visit Start Time: 1:15 PM  Visit Stop Time: 1:30 PM  Total Visit  Duration:  15 minutes

## 2025-02-25 ENCOUNTER — HOSPITAL ENCOUNTER (EMERGENCY)
Dept: HOSPITAL 99 - EMR | Age: 80
Discharge: HOME | End: 2025-02-25
Payer: MEDICARE

## 2025-02-25 VITALS — SYSTOLIC BLOOD PRESSURE: 159 MMHG | DIASTOLIC BLOOD PRESSURE: 86 MMHG | RESPIRATION RATE: 18 BRPM

## 2025-02-25 VITALS — RESPIRATION RATE: 20 BRPM | DIASTOLIC BLOOD PRESSURE: 91 MMHG | SYSTOLIC BLOOD PRESSURE: 135 MMHG

## 2025-02-25 VITALS — DIASTOLIC BLOOD PRESSURE: 74 MMHG | RESPIRATION RATE: 16 BRPM | SYSTOLIC BLOOD PRESSURE: 138 MMHG

## 2025-02-25 VITALS — RESPIRATION RATE: 20 BRPM | DIASTOLIC BLOOD PRESSURE: 85 MMHG | SYSTOLIC BLOOD PRESSURE: 149 MMHG

## 2025-02-25 VITALS — BODY MASS INDEX: 24.8 KG/M2

## 2025-02-25 DIAGNOSIS — Z86.73: ICD-10-CM

## 2025-02-25 DIAGNOSIS — I10: ICD-10-CM

## 2025-02-25 DIAGNOSIS — H53.122: Primary | ICD-10-CM

## 2025-02-25 LAB
ALBUMIN SERPL-MCNC: 4.4 G/DL (ref 3.5–5)
ALP SERPL-CCNC: 70 U/L (ref 38–126)
ALT SERPL-CCNC: 22 U/L (ref 0–50)
APTT PPP: 25.4 SEC (ref 23.4–35)
AST SERPL-CCNC: 24 U/L (ref 17–59)
BUN SERPL-MCNC: 24 MG/DL (ref 9–20)
CALCIUM SERPL-MCNC: 9 MG/DL (ref 8.4–10.2)
CHLORIDE SERPL-SCNC: 98 MMOL/L (ref 98–107)
CO2 SERPL-SCNC: 27 MMOL/L (ref 22–30)
EGFR: > 60
ERYTHROCYTE [DISTWIDTH] IN BLOOD BY AUTOMATED COUNT: 12.9 % (ref 11.5–14.5)
GLUCOSE SERPL-MCNC: 104 MG/DL (ref 70–99)
HCT VFR BLD AUTO: 37.4 % (ref 39–52)
HGB BLD-MCNC: 13.1 G/DL (ref 13–18)
INR PPP: 0.91
MCHC RBC AUTO-ENTMCNC: 35 G/DL (ref 33–37)
MCV RBC AUTO: 91.9 FL (ref 80–94)
NRBC BLD AUTO-RTO: 0 %
PLATELET # BLD AUTO: 186 10^3/UL (ref 130–400)
POTASSIUM SERPL-SCNC: 4.4 MMOL/L (ref 3.5–5.1)
PROT SERPL-MCNC: 6.8 G/DL (ref 6.3–8.2)
PROTHROMBIN TIME: 12.6 SEC (ref 11.4–14.6)
SODIUM SERPL-SCNC: 133 MMOL/L (ref 135–145)
TROPONIN I SERPL-MCNC: 0.01 NG/ML

## 2025-02-25 PROCEDURE — 99284 EMERGENCY DEPT VISIT MOD MDM: CPT

## 2025-03-12 ENCOUNTER — CONSULTATION (OUTPATIENT)
Dept: URBAN - METROPOLITAN AREA CLINIC 79 | Facility: CLINIC | Age: 80
End: 2025-03-12

## 2025-03-12 DIAGNOSIS — H02.831: ICD-10-CM

## 2025-03-12 DIAGNOSIS — G45.3: ICD-10-CM

## 2025-03-12 DIAGNOSIS — H02.834: ICD-10-CM

## 2025-03-12 DIAGNOSIS — H25.813: ICD-10-CM

## 2025-03-12 DIAGNOSIS — H40.013: ICD-10-CM

## 2025-03-12 DIAGNOSIS — H16.223: ICD-10-CM

## 2025-03-12 PROCEDURE — 92012 INTRM OPH EXAM EST PATIENT: CPT

## 2025-03-12 PROCEDURE — 92133 CPTRZD OPH DX IMG PST SGM ON: CPT

## 2025-03-12 PROCEDURE — 92083 EXTENDED VISUAL FIELD XM: CPT

## 2025-03-12 PROCEDURE — 76514 ECHO EXAM OF EYE THICKNESS: CPT

## 2025-03-12 ASSESSMENT — PACHYMETRY
OD_CT_UM: 588
OS_CT_UM: 590

## 2025-03-12 ASSESSMENT — TONOMETRY
OS_IOP_MMHG: 16
OD_IOP_MMHG: 16
OS_IOP_MMHG: 15
OD_IOP_MMHG: 14

## 2025-03-12 ASSESSMENT — VISUAL ACUITY
OS_CC: 20/25+2
OD_CC: 20/30-2

## 2025-03-14 ENCOUNTER — HOSPITAL ENCOUNTER (OUTPATIENT)
Dept: HOSPITAL 99 - RAD | Age: 80
End: 2025-03-14
Payer: MEDICARE

## 2025-03-14 DIAGNOSIS — R05.1: Primary | ICD-10-CM

## 2025-05-22 ENCOUNTER — OFFICE VISIT (OUTPATIENT)
Dept: PSYCHIATRY | Facility: CLINIC | Age: 80
End: 2025-05-22
Payer: MEDICARE

## 2025-05-22 DIAGNOSIS — F33.42 MAJOR DEPRESSIVE DISORDER, RECURRENT EPISODE, IN FULL REMISSION (HCC): ICD-10-CM

## 2025-05-22 DIAGNOSIS — F41.1 GENERALIZED ANXIETY DISORDER: ICD-10-CM

## 2025-05-22 PROCEDURE — 99214 OFFICE O/P EST MOD 30 MIN: CPT

## 2025-05-22 RX ORDER — CLONAZEPAM 0.5 MG/1
TABLET ORAL
Qty: 45 TABLET | Refills: 0 | Status: SHIPPED | OUTPATIENT
Start: 2025-05-22

## 2025-05-22 RX ORDER — QUETIAPINE FUMARATE 50 MG/1
TABLET, FILM COATED ORAL
Qty: 90 TABLET | Refills: 0 | Status: SHIPPED | OUTPATIENT
Start: 2025-05-22

## 2025-05-22 RX ORDER — SERTRALINE HYDROCHLORIDE 25 MG/1
TABLET, FILM COATED ORAL
Qty: 90 TABLET | Refills: 0 | Status: SHIPPED | OUTPATIENT
Start: 2025-05-22

## 2025-05-22 NOTE — PSYCH
Psychiatric Medication Management - Behavioral Health   Flavio Cuevas 80 y.o. male MRN: [id number]        Assessment/Plan:   1. Continue Seroquel 50 mg hs  2. Continue Zoloft 25 mg daily  3. Continue Klonopin 0.5 mg 1/2 tab hs prn  4. Call if any adverse medication side effects  5. Follow up in 3 months      Diagnosis: Major Depression, recurrent in full remission, BASSEM    Reason for Visit: medication management    Subjective:   Patient is being treated for depression and anxiety. Patient is observed on Klonopin 0.5mg 1/2 tab hs prn, seroquel 50mg po hs, and zoloft 25mg po daily. Patient tolerates the medications well, has been compliant and denies any side effects. Status quo. Patient reports doing well since last appointment. Mood is stable. Denies dysphoric moods including depression and anxiety. Sleep and appetite has been stable. Good energy and motivation. Patient denies elevated moods, panic attacks, paranoia, ah/vh and si/hi.        Review Of Systems:     Constitutional negative   ENT Vertigo- testing pending    Cardiovascular Benign heart murmur   Respiratory negative   Gastrointestinal negative   Genitourinary negative   Musculoskeletal Hip and lower back pain- mild   Integumentary negative   Neurological negative   Endocrine negative     Past Medical History:   Patient Active Problem List   Diagnosis    Generalized anxiety disorder    Major depressive disorder, recurrent episode, in full remission (HCC)    Vertigo    Vestibular neuronitis of both ears    Migraine with aura and without status migrainosus, not intractable    History of CVA (cerebrovascular accident)    Urinary retention       Allergies: NKDA    Past Surgical History: cleft palate repair, knee repair s/p sport injury    Past Psychiatric History: Chriss Mayers    Family Psychiatric History: father committed suicide when pt was 43    Social History: single, lost girlfriend of over 30 years to Covid in 2020, 2 step-children, 3 step-  grandkids    Substance Abuse History: pt denies    Traumatic History: emotional trauma ( found father who committed suicide)    The following portions of the patient's history were reviewed and updated as appropriate: past family history, past medical history, past social history, past surgical history and problem list.    Objective:  There were no vitals filed for this visit.      Weight (last 2 days)       None            Mental status:  Appearance Casually dressed   Mood euthymic   Affect pleasant   Speech Normal rate, rhythm, and volume   Thought Processes Linear and goal directed   Associations intact   Hallucinations Denies any auditory or visual hallucinations   Thought Content No passive or active suicidal or homicidal ideation, intent, or plan   Orientation Oriented to person, place, time, and situation   Recent and Remote Memory Grossly intact   Attention Span and Concentration intact   Intellect Appears to be of Average Intelligence   Insight intact   Judgement intact   Muscle Strength No abnormal movements   Language Within normal limits   Fund of Knowledge Age appropriate   Pain mild     PHQ-A Depression Screening                  Risks, Benefits And Possible Side Effects Of Medications:  Risks, benefits, and possible side effects of medications explained to patient and family, they verbalize understanding    Controlled Medication Discussion: Discussed with patient Black Box warning on concurrent use of benzodiazepines and opioid medications including sedation, respiratory depression, coma and death. Patient understands the risk of treatment with benzodiazepines in addition to opioids and wants to continue taking those medications.      Psychotherapy Provided: Supportive psychotherapy provided.     Yes  Medication education provided to Flavio.      Visit Time    Visit Start Time: 1:00 PM  Visit Stop Time: 1:13 PM  Total Visit Duration: 13 minutes

## 2025-07-18 ENCOUNTER — HOSPITAL ENCOUNTER (OUTPATIENT)
Dept: HOSPITAL 99 - MRI 3T | Age: 80
End: 2025-07-18
Payer: MEDICARE

## 2025-07-18 DIAGNOSIS — H53.122: Primary | ICD-10-CM

## 2025-07-25 ENCOUNTER — PROBLEM (OUTPATIENT)
Dept: URBAN - METROPOLITAN AREA CLINIC 79 | Facility: CLINIC | Age: 80
End: 2025-07-25

## 2025-07-25 DIAGNOSIS — H40.013: ICD-10-CM

## 2025-07-25 DIAGNOSIS — H02.834: ICD-10-CM

## 2025-07-25 DIAGNOSIS — H25.813: ICD-10-CM

## 2025-07-25 DIAGNOSIS — H04.123: ICD-10-CM

## 2025-07-25 DIAGNOSIS — H02.831: ICD-10-CM

## 2025-07-25 PROCEDURE — 92012 INTRM OPH EXAM EST PATIENT: CPT

## 2025-07-25 ASSESSMENT — VISUAL ACUITY
OD_CC: 20/30+2
OS_CC: 20/20

## 2025-07-25 ASSESSMENT — TONOMETRY
OD_IOP_MMHG: 12
OS_IOP_MMHG: 11

## 2025-08-08 ENCOUNTER — FOLLOW UP (OUTPATIENT)
Dept: URBAN - METROPOLITAN AREA CLINIC 79 | Facility: CLINIC | Age: 80
End: 2025-08-08

## 2025-08-08 DIAGNOSIS — H02.054: ICD-10-CM

## 2025-08-08 DIAGNOSIS — H04.123: ICD-10-CM

## 2025-08-08 DIAGNOSIS — H25.813: ICD-10-CM

## 2025-08-08 DIAGNOSIS — H40.013: ICD-10-CM

## 2025-08-08 DIAGNOSIS — H02.831: ICD-10-CM

## 2025-08-08 DIAGNOSIS — H02.834: ICD-10-CM

## 2025-08-08 PROCEDURE — 67820 REVISE EYELASHES: CPT

## 2025-08-08 PROCEDURE — 92012 INTRM OPH EXAM EST PATIENT: CPT | Mod: 25

## 2025-08-08 ASSESSMENT — VISUAL ACUITY
OS_SC: 20/25+3
OD_SC: 20/30+2

## 2025-08-08 ASSESSMENT — TONOMETRY
OD_IOP_MMHG: 12
OS_IOP_MMHG: 11

## (undated) RX ORDER — CYCLOSPORINE 0.5 MG/ML: 1 EMULSION OPHTHALMIC TWICE A DAY

## (undated) RX ORDER — NEOMYCIN SULFATE, POLYMYXIN B SULFATE AND DEXAMETHASONE 3.5; 10000; 1 MG/G; [USP'U]/G; MG/G: 1/4 OINTMENT OPHTHALMIC TWICE A DAY

## (undated) RX ORDER — CYCLOSPORINE 0.5 MG/ML
1 EMULSION OPHTHALMIC TWICE A DAY
Start: 2020-09-22